# Patient Record
Sex: MALE | Race: WHITE | NOT HISPANIC OR LATINO | Employment: OTHER | URBAN - METROPOLITAN AREA
[De-identification: names, ages, dates, MRNs, and addresses within clinical notes are randomized per-mention and may not be internally consistent; named-entity substitution may affect disease eponyms.]

---

## 2019-04-08 ENCOUNTER — OFFICE VISIT (OUTPATIENT)
Dept: ORTHOPEDICS | Facility: CLINIC | Age: 64
End: 2019-04-08

## 2019-04-08 VITALS
WEIGHT: 180 LBS | SYSTOLIC BLOOD PRESSURE: 124 MMHG | HEART RATE: 79 BPM | DIASTOLIC BLOOD PRESSURE: 70 MMHG | BODY MASS INDEX: 35.34 KG/M2 | HEIGHT: 60 IN

## 2019-04-08 DIAGNOSIS — M48.07 FORAMINAL STENOSIS OF LUMBOSACRAL REGION: ICD-10-CM

## 2019-04-08 DIAGNOSIS — M54.16 LUMBAR RADICULITIS: Primary | ICD-10-CM

## 2019-04-08 DIAGNOSIS — M51.36 DISC DEGENERATION, LUMBAR: ICD-10-CM

## 2019-04-08 PROCEDURE — 72100 X-RAY EXAM L-S SPINE 2/3 VWS: CPT | Mod: ,,, | Performed by: ORTHOPAEDIC SURGERY

## 2019-04-08 PROCEDURE — 72100 PR  X-RAY LUMBAR SPINE 2/3 VW: ICD-10-PCS | Mod: ,,, | Performed by: ORTHOPAEDIC SURGERY

## 2019-04-08 PROCEDURE — 99204 OFFICE O/P NEW MOD 45 MIN: CPT | Mod: ,,, | Performed by: ORTHOPAEDIC SURGERY

## 2019-04-08 PROCEDURE — 99204 PR OFFICE/OUTPT VISIT, NEW, LEVL IV, 45-59 MIN: ICD-10-PCS | Mod: ,,, | Performed by: ORTHOPAEDIC SURGERY

## 2019-04-08 PROCEDURE — 72170 X-RAY EXAM OF PELVIS: CPT | Mod: ,,, | Performed by: ORTHOPAEDIC SURGERY

## 2019-04-08 PROCEDURE — 72170 PR  X-RAY PELVIS 1/2 VW: ICD-10-PCS | Mod: ,,, | Performed by: ORTHOPAEDIC SURGERY

## 2019-04-08 RX ORDER — BUPROPION HYDROCHLORIDE 75 MG/1
75 TABLET ORAL 2 TIMES DAILY
COMMUNITY

## 2019-04-08 RX ORDER — CITALOPRAM 40 MG/1
40 TABLET, FILM COATED ORAL EVERY MORNING
COMMUNITY

## 2019-04-08 RX ORDER — PRAVASTATIN SODIUM 40 MG/1
40 TABLET ORAL DAILY
COMMUNITY
End: 2023-09-25

## 2019-04-08 NOTE — PROGRESS NOTES
"Subjective:       Patient ID: Arsh Damon is a 64 y.o. male.    Chief Complaint: Pain of the Lumbar Spine (Lumbar pain since . Pain radiates down right leg to knee with numbness. Limited walking and standing. NO bowel or bladder problems. )      History of Present Illness  About 18 months ago this gentleman had a syncopal episode and fell and started having complaints of pain in his back going down both legs left leg to the foot the right leg to the knee. He has weakness in his left leg and has a "flopping foot" for the last 16 months he had MRI performed in December 2017 he's had numerous chiropractic treatments he has no bowel or bladder incontinence    Current Medications  Current Outpatient Medications   Medication Sig Dispense Refill    buPROPion (WELLBUTRIN) 75 MG tablet Take 75 mg by mouth 2 (two) times daily.      citalopram (CELEXA) 40 MG tablet Take 40 mg by mouth once daily.      pravastatin (PRAVACHOL) 40 MG tablet Take 40 mg by mouth once daily.       No current facility-administered medications for this visit.        Allergies  Review of patient's allergies indicates:  No Known Allergies    Past Medical History  Past Medical History:   Diagnosis Date    Anxiety     Depression     Hyperlipemia        Surgical History  History reviewed. No pertinent surgical history.    Family History:   History reviewed. No pertinent family history.    Social History:   Social History     Socioeconomic History    Marital status:      Spouse name: Not on file    Number of children: Not on file    Years of education: Not on file    Highest education level: Not on file   Occupational History    Not on file   Social Needs    Financial resource strain: Not on file    Food insecurity:     Worry: Not on file     Inability: Not on file    Transportation needs:     Medical: Not on file     Non-medical: Not on file   Tobacco Use    Smoking status: Former Smoker    Smokeless tobacco: Never Used "   Substance and Sexual Activity    Alcohol use: Not on file    Drug use: Not on file    Sexual activity: Not on file   Lifestyle    Physical activity:     Days per week: Not on file     Minutes per session: Not on file    Stress: Not on file   Relationships    Social connections:     Talks on phone: Not on file     Gets together: Not on file     Attends Rastafarian service: Not on file     Active member of club or organization: Not on file     Attends meetings of clubs or organizations: Not on file     Relationship status: Not on file   Other Topics Concern    Not on file   Social History Narrative    Not on file       Hospitalization/Major Diagnostic Procedure:     Review of Systems     General/Constitutional:  Chills denies. Fatigue denies. Fever denies. Weight gain denies. Weight loss denies.    Respiratory:  Shortness of breath denies.    Cardiovascular:  Chest pain denies.    Gastrointestinal:  Constipation denies. Diarrhea denies. Nausea denies. Vomiting denies.     Hematology:  Easy bruising denies. Prolonged bleeding denies.     Genitourinary:  Frequent urination denies. Pain in lower back denies. Painful urination denies.     Musculoskeletal:  See HPI for details    Skin:  Rash denies.    Neurologic:  Dizziness denies. Gait abnormalities denies. Seizures denies. Tingling/Numbess denies.    Psychiatric:  Anxiety denies. Depressed mood denies.     Objective:   Vital Signs:   Vitals:    04/08/19 1024   BP: 124/70   Pulse: 79        Physical Exam      General Examination:     Constitutional: The patient is alert and oriented to lace person and time. Mood is pleasant.     Head/Face: Normal facial features normal eyebrows    Eyes: Normal extraocular motion bilaterally    Lungs: Respirations are equal and unlabored    Gait is coordinated.    Cardiovascular: There are no swelling or varicosities present.    Lymphatic: Negative for adenopathy    Skin: Normal    Neurological: Level of consciousness normal.  Oriented to place person and time and situation    Psychiatric: Oriented to time place person and situation    Physical exam shows patient can stand erect has moderate restriction of motion this tendency lumbosacral junction range of motion is slightly limited. Straight leg raising is positive on the left. There is no edema adenopathy or varicosities. Motor examination shows grade 45 weakness of left ankle dorsiflexors. Hip and knee range of motion is intact. Peripheral pulses are normal.    XRAY Report/ Interpretation : MRI performed 12/19/2017 was provided on compact disc by the patient and personally reviewed. MRI shows diminished signal intensity at L4-5 disc with a midline annular fissure and bilateral foraminal stenosis caused by diffuse disc bulge and bilateral facet hypertrophy. At L4-5 this diffuse disc bulge and slight facet joint hypertrophy and left-sided foraminal stenosis.  AP and lateral x-rays of lumbar spine will performed today. Indications low back pain. Findings: Mild disc space narrowing L5-S1 level with facet joint sclerosis  AP pelvis x-ray was taken today. Indications low back pain and/or hip pain. Findings AP pelvis x-ray appears to be normal with no evidence of fractures or significant degenerative disease  MRI report was reviewed showing evidence of disc degeneration at L5-S1 a right-sided spondylolysis at L5 and foraminal stenosis bilaterally at L5-S1 interestingly radiologist did not think there is evidence of foraminal stenosis on the left side at L4-5 that I think is present        Assessment:       1. Lumbar radiculitis    2. Foraminal stenosis of lumbosacral region    3. Disc degeneration, lumbar        Plan:       Arsh was seen today for pain.    Diagnoses and all orders for this visit:    Lumbar radiculitis  -     X-Ray Lumbar Spine Ap And Lateral  -     X-Ray Pelvis Routine AP    Foraminal stenosis of lumbosacral region    Disc degeneration, lumbar         No follow-ups on  file.    Treatment options were discussed regards to the nature of the spinal condition conservative pain interventional and surgical options were discussed in detail and the probability of success of the separate treatment options was discussed in detail the patient expressed a clear understanding of the treatment options would regards to surgery the procedure risks benefits complications and outcomes were discussed.  No guarantees were given regards to surgical outcome.  New MRI lumbar spine is advised.  In view of long-standing motor weakness left lower extremity I've advised the patient that he may have to [place] consider having surgical intervention.          This note was created using Dragon voice recognition software that occasionally misinterpreted phrases or words.

## 2019-05-29 ENCOUNTER — OFFICE VISIT (OUTPATIENT)
Dept: ORTHOPEDICS | Facility: CLINIC | Age: 64
End: 2019-05-29

## 2019-05-29 VITALS
HEART RATE: 84 BPM | BODY MASS INDEX: 35.34 KG/M2 | DIASTOLIC BLOOD PRESSURE: 78 MMHG | SYSTOLIC BLOOD PRESSURE: 128 MMHG | HEIGHT: 60 IN | WEIGHT: 180 LBS

## 2019-05-29 DIAGNOSIS — M48.07 FORAMINAL STENOSIS OF LUMBOSACRAL REGION: Primary | ICD-10-CM

## 2019-05-29 DIAGNOSIS — M43.00 PARS DEFECT WITHOUT SPONDYLOLISTHESIS: ICD-10-CM

## 2019-05-29 DIAGNOSIS — M51.37 DISC DEGENERATION, LUMBOSACRAL: ICD-10-CM

## 2019-05-29 PROCEDURE — 99213 OFFICE O/P EST LOW 20 MIN: CPT | Mod: ,,, | Performed by: ORTHOPAEDIC SURGERY

## 2019-05-29 PROCEDURE — 99213 PR OFFICE/OUTPT VISIT, EST, LEVL III, 20-29 MIN: ICD-10-PCS | Mod: ,,, | Performed by: ORTHOPAEDIC SURGERY

## 2019-05-29 RX ORDER — TRAMADOL HYDROCHLORIDE 50 MG/1
50 TABLET ORAL EVERY 6 HOURS PRN
Qty: 30 TABLET | Refills: 4 | Status: SHIPPED | OUTPATIENT
Start: 2019-05-29 | End: 2019-06-05

## 2019-05-29 RX ORDER — TESTOSTERONE CYPIONATE 200 MG/ML
200 INJECTION, SOLUTION INTRAMUSCULAR
Refills: 5 | COMMUNITY
Start: 2019-05-03

## 2019-05-29 NOTE — PROGRESS NOTES
"Subjective:       Patient ID: Arsh Damon is a 64 y.o. male.    Chief Complaint: Pain of the Lower Back (Lumbar MRI results, pain is increased when he stands or when activity level is increased)      History of Present Illness     About 18 months ago this gentleman had a syncopal episode and fell and started having complaints of pain in his back going down both legs left leg to the foot the right leg to the knee. He has weakness in his left leg and has a "flopping foot" for the last 16 months he had MRI performed in December 2017 he's had numerous chiropractic treatments he has no bowel or bladder incontinence and teased severe back and bilateral pain numbness tingling and dysesthesias no bowel or bladder incontinence symptoms have been going on for 17 months.    Current Medications  Current Outpatient Medications   Medication Sig Dispense Refill    buPROPion (WELLBUTRIN) 75 MG tablet Take 75 mg by mouth 2 (two) times daily.      citalopram (CELEXA) 40 MG tablet Take 40 mg by mouth once daily.      pravastatin (PRAVACHOL) 40 MG tablet Take 40 mg by mouth once daily.      testosterone cypionate (DEPOTESTOTERONE CYPIONATE) 200 mg/mL injection TK 1 ML IM WEEKLY  5    traMADol (ULTRAM) 50 mg tablet Take 1 tablet (50 mg total) by mouth every 6 (six) hours as needed for Pain. 30 tablet 4     No current facility-administered medications for this visit.        Allergies  Review of patient's allergies indicates:  No Known Allergies    Past Medical History  Past Medical History:   Diagnosis Date    Anxiety     Depression     Hyperlipemia        Surgical History  History reviewed. No pertinent surgical history.    Family History:   History reviewed. No pertinent family history.    Social History:   Social History     Socioeconomic History    Marital status:      Spouse name: Not on file    Number of children: Not on file    Years of education: Not on file    Highest education level: Not on file "   Occupational History    Not on file   Social Needs    Financial resource strain: Not on file    Food insecurity:     Worry: Not on file     Inability: Not on file    Transportation needs:     Medical: Not on file     Non-medical: Not on file   Tobacco Use    Smoking status: Former Smoker    Smokeless tobacco: Never Used   Substance and Sexual Activity    Alcohol use: Not on file    Drug use: Not on file    Sexual activity: Not on file   Lifestyle    Physical activity:     Days per week: Not on file     Minutes per session: Not on file    Stress: Not on file   Relationships    Social connections:     Talks on phone: Not on file     Gets together: Not on file     Attends Quaker service: Not on file     Active member of club or organization: Not on file     Attends meetings of clubs or organizations: Not on file     Relationship status: Not on file   Other Topics Concern    Not on file   Social History Narrative    Not on file       Hospitalization/Major Diagnostic Procedure:     Review of Systems     General/Constitutional:  Chills denies. Fatigue denies. Fever denies. Weight gain denies. Weight loss denies.    Respiratory:  Shortness of breath denies.    Cardiovascular:  Chest pain denies.    Gastrointestinal:  Constipation denies. Diarrhea denies. Nausea denies. Vomiting denies.     Hematology:  Easy bruising denies. Prolonged bleeding denies.     Genitourinary:  Frequent urination denies. Pain in lower back denies. Painful urination denies.     Musculoskeletal:  See HPI for details    Skin:  Rash denies.    Neurologic:  Dizziness denies. Gait abnormalities denies. Seizures denies. Tingling/Numbess denies.    Psychiatric:  Anxiety denies. Depressed mood denies.     Objective:   Vital Signs:   Vitals:    05/29/19 1127   BP: 128/78   Pulse: 84        Physical Exam      General Examination:     Constitutional: The patient is alert and oriented to lace person and time. Mood is pleasant.     Head/Face:  Normal facial features normal eyebrows    Eyes: Normal extraocular motion bilaterally    Lungs: Respirations are equal and unlabored    Gait is coordinated.    Cardiovascular: There are no swelling or varicosities present.    Lymphatic: Negative for adenopathy    Skin: Normal    Neurological: Level of consciousness normal. Oriented to place person and time and situation    Psychiatric: Oriented to time place person and situation    Bilateral paraspinous muscle spasm L4-S1 range of motion markedly restricted straight leg raising positive on the left. Subjective numbness L5 nerve root distribution bilaterally.  XRAY Report/ Interpretation:     XRAY Report/ Interpretation : MRI performed 12/19/2017 was provided on compact disc by the patient and personally reviewed. MRI shows diminished signal intensity at L4-5 disc with a midline annular fissure and bilateral foraminal stenosis caused by diffuse disc bulge and bilateral facet hypertrophy. At L4-5 this diffuse disc bulge and slight facet joint hypertrophy and left-sided foraminal stenosis.  AP and lateral x-rays of lumbar spine will performed today. Indications low back pain. Findings: Mild disc space narrowing L5-S1 level with facet joint sclerosis  AP pelvis x-ray was taken today. Indications low back pain and/or hip pain. Findings AP pelvis x-ray appears to be normal with no evidence of fractures or significant degenerative disease  MRI report was reviewed showing evidence of disc degeneration at L5-S1 a right-sided spondylolysis at L5 and foraminal stenosis bilaterally at L5-S1 interestingly radiologist did not think there is evidence of foraminal stenosis on the left side at L4-5 that I think is present  Lumbar mri 4/16/19 reviewed degenerative changes noted with suspicion of bilateral pars defects at L4-5 without spondylolisthesis a left-sided pars and articular is defect may have developed in the interval there is significant neural foraminal narrowing  bilaterally seen at this level traversing and affecting the L5 nerve roots at the level of the neural foramen    Assessment:       1. Foraminal stenosis of lumbosacral region    2. Pars defect without spondylolisthesis    3. Disc degeneration, lumbosacral        Plan:       Arsh was seen today for pain.    Diagnoses and all orders for this visit:    Foraminal stenosis of lumbosacral region    Pars defect without spondylolisthesis  Comments:  L5-S1    Disc degeneration, lumbosacral    Other orders  -     traMADol (ULTRAM) 50 mg tablet; Take 1 tablet (50 mg total) by mouth every 6 (six) hours as needed for Pain.         Follow up if symptoms worsen or fail to improve.    Treatment options were discussed regards to the nature of the spinal condition conservative pain interventional and surgical options were discussed in detail and the probability of success of the separate treatment options was discussed in detail the patient expressed a clear understanding of the treatment options would regards to surgery the procedure risks benefits complications and outcomes were discussed.  No guarantees were given regards to surgical outcome.  Progress  The risk associated with the spinal condition and an worsening of the condition was discussed with the patient expressed a thorough understanding.  The patient was warned about the possible development of cauda equina syndrome and its symptoms which include but are not limited to bowel or bladder dysfunction sexual dysfunction increasing pain increasing extremity numbness or weakness and saddle anesthesia.  The patient was advised to either contact me immediately or to go to the nearest hospital emergency room if symptoms of cauda equina syndrome with to develop.  The patient expressed an understanding of these instructions.  He plans on apply so security so that he might undergo surgery surgery was only option as well as resolving his complaints. I do believe that he is disabled.  His symptoms are not tracked ago where his condition improved in the foreseeable future and certainly not within the next 12 months    This note was created using Dragon voice recognition software that occasionally misinterpreted phrases or words.

## 2019-10-28 ENCOUNTER — TELEPHONE (OUTPATIENT)
Dept: ORTHOPEDICS | Facility: CLINIC | Age: 64
End: 2019-10-28

## 2019-10-28 NOTE — TELEPHONE ENCOUNTER
----- Message from Sheryl Penn sent at 10/23/2019 10:55 AM CDT -----  Contact: patient  Dr huerta pt-called to find out if we heard back from his ssi to cover the surgery pts# [phone]

## 2019-10-28 NOTE — TELEPHONE ENCOUNTER
----- Message from Leslye Jurado sent at 10/25/2019  9:57 AM CDT -----  Contact: patient  Patient is calling about disability paperwork that needed to be done, call patient [phone].

## 2019-11-22 ENCOUNTER — HISTORICAL (OUTPATIENT)
Dept: ADMINISTRATIVE | Facility: HOSPITAL | Age: 64
End: 2019-11-22

## 2019-11-22 LAB
ALBUMIN SERPL-MCNC: 3.7 GM/DL (ref 3.4–5)
ALBUMIN/GLOB SERPL: 1 RATIO (ref 1.1–2)
ALP SERPL-CCNC: 58 UNIT/L (ref 45–117)
ALT SERPL-CCNC: 36 UNIT/L (ref 12–78)
AST SERPL-CCNC: 14 UNIT/L (ref 15–37)
BILIRUB SERPL-MCNC: 0.5 MG/DL (ref 0.2–1)
BILIRUBIN DIRECT+TOT PNL SERPL-MCNC: 0.1 MG/DL (ref 0–0.2)
BILIRUBIN DIRECT+TOT PNL SERPL-MCNC: 0.4 MG/DL
BUN SERPL-MCNC: 19 MG/DL (ref 7–18)
CALCIUM SERPL-MCNC: 8.8 MG/DL (ref 8.5–10.1)
CHLORIDE SERPL-SCNC: 103 MMOL/L (ref 98–107)
CHOLEST SERPL-MCNC: 233 MG/DL
CHOLEST/HDLC SERPL: 6.7 {RATIO} (ref 0–5)
CO2 SERPL-SCNC: 32 MMOL/L (ref 21–32)
CREAT SERPL-MCNC: 0.9 MG/DL (ref 0.6–1.3)
GLOBULIN SER-MCNC: 3.8 GM/ML (ref 2.3–3.5)
GLUCOSE SERPL-MCNC: 92 MG/DL (ref 74–106)
HDLC SERPL-MCNC: 35 MG/DL (ref 40–59)
LDLC SERPL CALC-MCNC: 167 MG/DL
POTASSIUM SERPL-SCNC: 4.4 MMOL/L (ref 3.5–5.1)
PROT SERPL-MCNC: 7.5 GM/DL (ref 6.4–8.2)
PSA SERPL-MCNC: 1.4 NG/ML
SODIUM SERPL-SCNC: 139 MMOL/L (ref 136–145)
T4 FREE SERPL-MCNC: 0.85 NG/DL (ref 0.76–1.46)
TESTOST SERPL-MCNC: 1659 NG/DL (ref 241–827)
TRIGL SERPL-MCNC: 154 MG/DL
TSH SERPL-ACNC: 1.42 MIU/L (ref 0.36–3.74)
VIT B12 SERPL-MCNC: 561 PG/ML (ref 193–986)
VLDLC SERPL CALC-MCNC: 31 MG/DL

## 2021-06-08 ENCOUNTER — HISTORICAL (OUTPATIENT)
Dept: ADMINISTRATIVE | Facility: HOSPITAL | Age: 66
End: 2021-06-08

## 2022-04-11 ENCOUNTER — HISTORICAL (OUTPATIENT)
Dept: ADMINISTRATIVE | Facility: HOSPITAL | Age: 67
End: 2022-04-11

## 2022-04-24 VITALS
WEIGHT: 181.69 LBS | SYSTOLIC BLOOD PRESSURE: 120 MMHG | HEIGHT: 60 IN | BODY MASS INDEX: 35.67 KG/M2 | DIASTOLIC BLOOD PRESSURE: 69 MMHG | OXYGEN SATURATION: 94 %

## 2022-07-08 ENCOUNTER — HOSPITAL ENCOUNTER (INPATIENT)
Facility: HOSPITAL | Age: 67
LOS: 2 days | Discharge: LEFT BEFORE DISCHARGE | DRG: 177 | End: 2022-07-11
Attending: EMERGENCY MEDICINE | Admitting: INTERNAL MEDICINE
Payer: MEDICARE

## 2022-07-08 DIAGNOSIS — R06.02 SHORTNESS OF BREATH: ICD-10-CM

## 2022-07-08 DIAGNOSIS — J96.01 ACUTE HYPOXEMIC RESPIRATORY FAILURE: ICD-10-CM

## 2022-07-08 DIAGNOSIS — R06.02 SOB (SHORTNESS OF BREATH): ICD-10-CM

## 2022-07-08 DIAGNOSIS — I21.4 NSTEMI (NON-ST ELEVATED MYOCARDIAL INFARCTION): ICD-10-CM

## 2022-07-08 DIAGNOSIS — J12.82 PNEUMONIA DUE TO COVID-19 VIRUS: ICD-10-CM

## 2022-07-08 DIAGNOSIS — R41.0 DELIRIUM: Primary | ICD-10-CM

## 2022-07-08 DIAGNOSIS — U07.1 PNEUMONIA DUE TO COVID-19 VIRUS: ICD-10-CM

## 2022-07-08 PROCEDURE — 84484 ASSAY OF TROPONIN QUANT: CPT | Performed by: EMERGENCY MEDICINE

## 2022-07-08 PROCEDURE — 83880 ASSAY OF NATRIURETIC PEPTIDE: CPT | Performed by: EMERGENCY MEDICINE

## 2022-07-08 PROCEDURE — 25000242 PHARM REV CODE 250 ALT 637 W/ HCPCS: Performed by: EMERGENCY MEDICINE

## 2022-07-08 PROCEDURE — 87636 SARSCOV2 & INF A&B AMP PRB: CPT | Performed by: EMERGENCY MEDICINE

## 2022-07-08 PROCEDURE — 96365 THER/PROPH/DIAG IV INF INIT: CPT

## 2022-07-08 PROCEDURE — 93010 ELECTROCARDIOGRAM REPORT: CPT | Mod: ,,, | Performed by: INTERNAL MEDICINE

## 2022-07-08 PROCEDURE — 94640 AIRWAY INHALATION TREATMENT: CPT

## 2022-07-08 PROCEDURE — 27000221 HC OXYGEN, UP TO 24 HOURS

## 2022-07-08 PROCEDURE — 80053 COMPREHEN METABOLIC PANEL: CPT | Performed by: EMERGENCY MEDICINE

## 2022-07-08 PROCEDURE — 85379 FIBRIN DEGRADATION QUANT: CPT | Performed by: EMERGENCY MEDICINE

## 2022-07-08 PROCEDURE — 94760 N-INVAS EAR/PLS OXIMETRY 1: CPT

## 2022-07-08 PROCEDURE — 36415 COLL VENOUS BLD VENIPUNCTURE: CPT | Performed by: EMERGENCY MEDICINE

## 2022-07-08 PROCEDURE — 87040 BLOOD CULTURE FOR BACTERIA: CPT | Performed by: EMERGENCY MEDICINE

## 2022-07-08 PROCEDURE — 83605 ASSAY OF LACTIC ACID: CPT | Performed by: EMERGENCY MEDICINE

## 2022-07-08 PROCEDURE — 96375 TX/PRO/DX INJ NEW DRUG ADDON: CPT

## 2022-07-08 PROCEDURE — 93010 EKG 12-LEAD: ICD-10-PCS | Mod: ,,, | Performed by: INTERNAL MEDICINE

## 2022-07-08 PROCEDURE — 85025 COMPLETE CBC W/AUTO DIFF WBC: CPT | Performed by: EMERGENCY MEDICINE

## 2022-07-08 PROCEDURE — 93005 ELECTROCARDIOGRAM TRACING: CPT

## 2022-07-08 PROCEDURE — 99285 EMERGENCY DEPT VISIT HI MDM: CPT | Mod: 25

## 2022-07-08 RX ORDER — IPRATROPIUM BROMIDE AND ALBUTEROL SULFATE 2.5; .5 MG/3ML; MG/3ML
3 SOLUTION RESPIRATORY (INHALATION)
Status: COMPLETED | OUTPATIENT
Start: 2022-07-08 | End: 2022-07-08

## 2022-07-08 RX ORDER — KETOROLAC TROMETHAMINE 30 MG/ML
30 INJECTION, SOLUTION INTRAMUSCULAR; INTRAVENOUS ONCE
Status: COMPLETED | OUTPATIENT
Start: 2022-07-09 | End: 2022-07-09

## 2022-07-08 RX ADMIN — IPRATROPIUM BROMIDE AND ALBUTEROL SULFATE 3 ML: 2.5; .5 SOLUTION RESPIRATORY (INHALATION) at 11:07

## 2022-07-09 ENCOUNTER — HOSPITAL ENCOUNTER (OUTPATIENT)
Dept: RADIOLOGY | Facility: HOSPITAL | Age: 67
Discharge: HOME | End: 2022-07-09
Attending: EMERGENCY MEDICINE
Payer: MEDICARE

## 2022-07-09 PROBLEM — J96.01 ACUTE HYPOXEMIC RESPIRATORY FAILURE DUE TO COVID-19: Status: ACTIVE | Noted: 2022-07-09

## 2022-07-09 PROBLEM — F41.9 ANXIETY: Status: ACTIVE | Noted: 2022-07-09

## 2022-07-09 PROBLEM — E78.5 HLD (HYPERLIPIDEMIA): Status: ACTIVE | Noted: 2022-07-09

## 2022-07-09 PROBLEM — U07.1 ACUTE HYPOXEMIC RESPIRATORY FAILURE DUE TO COVID-19: Status: ACTIVE | Noted: 2022-07-09

## 2022-07-09 LAB
ALBUMIN SERPL-MCNC: 3.7 GM/DL (ref 3.4–4.8)
ALBUMIN SERPL-MCNC: 3.9 GM/DL (ref 3.4–4.8)
ALBUMIN/GLOB SERPL: 1 RATIO (ref 1.1–2)
ALBUMIN/GLOB SERPL: 1.4 RATIO (ref 1.1–2)
ALP SERPL-CCNC: 49 UNIT/L (ref 40–150)
ALP SERPL-CCNC: 50 UNIT/L (ref 40–150)
ALT SERPL-CCNC: 32 UNIT/L (ref 0–55)
ALT SERPL-CCNC: 34 UNIT/L (ref 0–55)
APPEARANCE UR: CLEAR
AST SERPL-CCNC: 84 UNIT/L (ref 5–34)
AST SERPL-CCNC: 91 UNIT/L (ref 5–34)
BACTERIA #/AREA URNS AUTO: ABNORMAL /HPF
BASE EXCESS ARTERIAL: 12 MMOL/L (ref -2–2)
BASOPHILS # BLD AUTO: 0.01 X10(3)/MCL (ref 0–0.2)
BASOPHILS # BLD AUTO: 0.01 X10(3)/MCL (ref 0–0.2)
BASOPHILS NFR BLD AUTO: 0.1 %
BASOPHILS NFR BLD AUTO: 0.1 %
BILIRUB UR QL STRIP.AUTO: NEGATIVE MG/DL
BILIRUBIN DIRECT+TOT PNL SERPL-MCNC: 0.7 MG/DL
BILIRUBIN DIRECT+TOT PNL SERPL-MCNC: 0.7 MG/DL
BNP BLD-MCNC: 139.8 PG/ML
BUN SERPL-MCNC: 27.1 MG/DL (ref 8.4–25.7)
BUN SERPL-MCNC: 30.2 MG/DL (ref 8.4–25.7)
CALCIUM SERPL-MCNC: 8.9 MG/DL (ref 8.8–10)
CALCIUM SERPL-MCNC: 9.1 MG/DL (ref 8.8–10)
CHLORIDE SERPL-SCNC: 93 MMOL/L (ref 98–107)
CHLORIDE SERPL-SCNC: 95 MMOL/L (ref 98–107)
CK MB SERPL-MCNC: 9.4 NG/ML
CK SERPL-CCNC: 2555 U/L (ref 30–200)
CO2 SERPL-SCNC: 32 MMOL/L (ref 23–31)
CO2 SERPL-SCNC: 34 MMOL/L (ref 23–31)
COLOR UR AUTO: YELLOW
CREAT SERPL-MCNC: 0.95 MG/DL (ref 0.73–1.18)
CREAT SERPL-MCNC: 0.97 MG/DL (ref 0.73–1.18)
D DIMER PPP IA.FEU-MCNC: 1.24 UG/ML FEU (ref 0–0.5)
EOSINOPHIL # BLD AUTO: 0.01 X10(3)/MCL (ref 0–0.9)
EOSINOPHIL # BLD AUTO: 0.02 X10(3)/MCL (ref 0–0.9)
EOSINOPHIL NFR BLD AUTO: 0.1 %
EOSINOPHIL NFR BLD AUTO: 0.2 %
ERYTHROCYTE [DISTWIDTH] IN BLOOD BY AUTOMATED COUNT: 12.9 % (ref 11.5–17)
ERYTHROCYTE [DISTWIDTH] IN BLOOD BY AUTOMATED COUNT: 13 % (ref 11.5–17)
FLUAV AG UPPER RESP QL IA.RAPID: NOT DETECTED
FLUBV AG UPPER RESP QL IA.RAPID: NOT DETECTED
GLOBULIN SER-MCNC: 2.8 GM/DL (ref 2.4–3.5)
GLOBULIN SER-MCNC: 3.6 GM/DL (ref 2.4–3.5)
GLUCOSE SERPL-MCNC: 105 MG/DL (ref 82–115)
GLUCOSE SERPL-MCNC: 129 MG/DL (ref 82–115)
GLUCOSE UR QL STRIP.AUTO: NEGATIVE MG/DL
HCO3 ARTERIAL: 36.4 MMOL/L (ref 18–23)
HCT VFR BLD AUTO: 43.1 % (ref 42–52)
HCT VFR BLD AUTO: 45.8 % (ref 42–52)
HGB BLD-MCNC: 14.2 GM/DL (ref 14–18)
HGB BLD-MCNC: 15.1 GM/DL (ref 14–18)
HGB BLD-MCNC: ABNORMAL G/DL
IMM GRANULOCYTES # BLD AUTO: 0.04 X10(3)/MCL (ref 0–0.04)
IMM GRANULOCYTES # BLD AUTO: 0.05 X10(3)/MCL (ref 0–0.04)
IMM GRANULOCYTES NFR BLD AUTO: 0.5 %
IMM GRANULOCYTES NFR BLD AUTO: 0.6 %
KETONES UR QL STRIP.AUTO: NEGATIVE MG/DL
LACTATE SERPL-SCNC: 0.8 MMOL/L (ref 0.5–2.2)
LEUKOCYTE ESTERASE UR QL STRIP.AUTO: NEGATIVE UNIT/L
LYMPHOCYTES # BLD AUTO: 0.52 X10(3)/MCL (ref 0.6–4.6)
LYMPHOCYTES # BLD AUTO: 0.7 X10(3)/MCL (ref 0.6–4.6)
LYMPHOCYTES NFR BLD AUTO: 6.1 %
LYMPHOCYTES NFR BLD AUTO: 8.1 %
MCH RBC QN AUTO: 30.8 PG (ref 27–31)
MCH RBC QN AUTO: 31.1 PG (ref 27–31)
MCHC RBC AUTO-ENTMCNC: 32.9 MG/DL (ref 33–36)
MCHC RBC AUTO-ENTMCNC: 33 MG/DL (ref 33–36)
MCV RBC AUTO: 93.3 FL (ref 80–94)
MCV RBC AUTO: 94.3 FL (ref 80–94)
MONOCYTES # BLD AUTO: 0.53 X10(3)/MCL (ref 0.1–1.3)
MONOCYTES # BLD AUTO: 0.77 X10(3)/MCL (ref 0.1–1.3)
MONOCYTES NFR BLD AUTO: 6.2 %
MONOCYTES NFR BLD AUTO: 8.9 %
NEUTROPHILS # BLD AUTO: 7.1 X10(3)/MCL (ref 2.1–9.2)
NEUTROPHILS # BLD AUTO: 7.5 X10(3)/MCL (ref 2.1–9.2)
NEUTROPHILS NFR BLD AUTO: 82.1 %
NEUTROPHILS NFR BLD AUTO: 87 %
NITRITE UR QL STRIP.AUTO: NEGATIVE
NRBC BLD AUTO-RTO: 0 %
NRBC BLD AUTO-RTO: 0 %
PCO2 BLDA: 57.4 MM[HG]
PCO2 BLDA: ABNORMAL MM[HG]
PH SMN: 7.41 [PH]
PH UR STRIP.AUTO: 6 [PH]
PLATELET # BLD AUTO: 180 X10(3)/MCL (ref 130–400)
PLATELET # BLD AUTO: 181 X10(3)/MCL (ref 130–400)
PLATELETS.RETICULATED NFR BLD AUTO: 2.2 % (ref 0.9–11.2)
PMV BLD AUTO: 8.7 FL (ref 7.4–10.4)
PMV BLD AUTO: 9.1 FL (ref 7.4–10.4)
PO2 BLDA: 158 MM[HG]
POC COHB: ABNORMAL
POC IONIZED CALCIUM: ABNORMAL
POC METHB: ABNORMAL
POC O2HB: ABNORMAL
POTASSIUM BLD-SCNC: ABNORMAL MMOL/L
POTASSIUM SERPL-SCNC: 4.9 MMOL/L (ref 3.5–5.1)
POTASSIUM SERPL-SCNC: 5.2 MMOL/L (ref 3.5–5.1)
PROT SERPL-MCNC: 6.7 GM/DL (ref 5.8–7.6)
PROT SERPL-MCNC: 7.3 GM/DL (ref 5.8–7.6)
PROT UR QL STRIP.AUTO: ABNORMAL MG/DL
RBC # BLD AUTO: 4.57 X10(6)/MCL (ref 4.7–6.1)
RBC # BLD AUTO: 4.91 X10(6)/MCL (ref 4.7–6.1)
RBC #/AREA URNS AUTO: ABNORMAL /HPF
RBC UR QL AUTO: ABNORMAL UNIT/L
SARS-COV-2 RNA RESP QL NAA+PROBE: DETECTED
SATURATED O2 ARTERIAL, I-STAT: 99
SODIUM BLD-SCNC: ABNORMAL MMOL/L
SODIUM SERPL-SCNC: 137 MMOL/L (ref 136–145)
SODIUM SERPL-SCNC: 138 MMOL/L (ref 136–145)
SP GR UR STRIP.AUTO: 1.02
SQUAMOUS #/AREA URNS AUTO: ABNORMAL /HPF
TROPONIN I SERPL-MCNC: 1.73 NG/ML (ref 0–0.04)
TROPONIN I SERPL-MCNC: 1.73 NG/ML (ref 0–0.04)
UROBILINOGEN UR STRIP-ACNC: 0.2 MG/DL
WBC # SPEC AUTO: 8.6 X10(3)/MCL (ref 4.5–11.5)
WBC # SPEC AUTO: 8.7 X10(3)/MCL (ref 4.5–11.5)
WBC #/AREA URNS AUTO: ABNORMAL /HPF

## 2022-07-09 PROCEDURE — 25000003 PHARM REV CODE 250: Performed by: EMERGENCY MEDICINE

## 2022-07-09 PROCEDURE — 11000001 HC ACUTE MED/SURG PRIVATE ROOM

## 2022-07-09 PROCEDURE — 81001 URINALYSIS AUTO W/SCOPE: CPT | Performed by: EMERGENCY MEDICINE

## 2022-07-09 PROCEDURE — 25000003 PHARM REV CODE 250: Performed by: INTERNAL MEDICINE

## 2022-07-09 PROCEDURE — 80053 COMPREHEN METABOLIC PANEL: CPT | Performed by: EMERGENCY MEDICINE

## 2022-07-09 PROCEDURE — 82550 ASSAY OF CK (CPK): CPT | Performed by: EMERGENCY MEDICINE

## 2022-07-09 PROCEDURE — 27000190 HC CPAP FULL FACE MASK W/VALVE

## 2022-07-09 PROCEDURE — 84484 ASSAY OF TROPONIN QUANT: CPT | Performed by: EMERGENCY MEDICINE

## 2022-07-09 PROCEDURE — 94660 CPAP INITIATION&MGMT: CPT

## 2022-07-09 PROCEDURE — 25500020 PHARM REV CODE 255: Performed by: EMERGENCY MEDICINE

## 2022-07-09 PROCEDURE — 82553 CREATINE MB FRACTION: CPT | Performed by: EMERGENCY MEDICINE

## 2022-07-09 PROCEDURE — 85025 COMPLETE CBC W/AUTO DIFF WBC: CPT | Performed by: EMERGENCY MEDICINE

## 2022-07-09 PROCEDURE — 36415 COLL VENOUS BLD VENIPUNCTURE: CPT | Performed by: EMERGENCY MEDICINE

## 2022-07-09 PROCEDURE — 27000207 HC ISOLATION

## 2022-07-09 PROCEDURE — 63600175 PHARM REV CODE 636 W HCPCS: Performed by: EMERGENCY MEDICINE

## 2022-07-09 PROCEDURE — 99900035 HC TECH TIME PER 15 MIN (STAT)

## 2022-07-09 PROCEDURE — 63600175 PHARM REV CODE 636 W HCPCS: Performed by: INTERNAL MEDICINE

## 2022-07-09 PROCEDURE — 71046 X-RAY EXAM CHEST 2 VIEWS: CPT | Mod: TC

## 2022-07-09 PROCEDURE — 27000221 HC OXYGEN, UP TO 24 HOURS

## 2022-07-09 PROCEDURE — 82803 BLOOD GASES ANY COMBINATION: CPT

## 2022-07-09 RX ORDER — CHLORPROMAZINE HYDROCHLORIDE 25 MG/1
50 TABLET, FILM COATED ORAL 2 TIMES DAILY
COMMUNITY
Start: 2022-07-08 | End: 2023-09-25

## 2022-07-09 RX ORDER — SODIUM CHLORIDE 0.9 % (FLUSH) 0.9 %
10 SYRINGE (ML) INJECTION
Status: DISCONTINUED | OUTPATIENT
Start: 2022-07-09 | End: 2022-07-11 | Stop reason: HOSPADM

## 2022-07-09 RX ORDER — ATORVASTATIN CALCIUM 40 MG/1
40 TABLET, FILM COATED ORAL EVERY MORNING
COMMUNITY
Start: 2022-06-14

## 2022-07-09 RX ORDER — BUPROPION HYDROCHLORIDE 75 MG/1
75 TABLET ORAL 2 TIMES DAILY
Status: DISCONTINUED | OUTPATIENT
Start: 2022-07-09 | End: 2022-07-11 | Stop reason: HOSPADM

## 2022-07-09 RX ORDER — HYDROCODONE BITARTRATE AND ACETAMINOPHEN 10; 325 MG/1; MG/1
1 TABLET ORAL EVERY 4 HOURS PRN
Status: DISCONTINUED | OUTPATIENT
Start: 2022-07-09 | End: 2022-07-11 | Stop reason: HOSPADM

## 2022-07-09 RX ORDER — REMDESIVIR 100 MG/1
INJECTION, POWDER, LYOPHILIZED, FOR SOLUTION INTRAVENOUS
Status: DISPENSED
Start: 2022-07-09 | End: 2022-07-09

## 2022-07-09 RX ORDER — ENOXAPARIN SODIUM 100 MG/ML
40 INJECTION SUBCUTANEOUS EVERY 24 HOURS
Status: DISCONTINUED | OUTPATIENT
Start: 2022-07-09 | End: 2022-07-11 | Stop reason: HOSPADM

## 2022-07-09 RX ORDER — DEXAMETHASONE SODIUM PHOSPHATE 4 MG/ML
6 INJECTION, SOLUTION INTRA-ARTICULAR; INTRALESIONAL; INTRAMUSCULAR; INTRAVENOUS; SOFT TISSUE
Status: COMPLETED | OUTPATIENT
Start: 2022-07-09 | End: 2022-07-09

## 2022-07-09 RX ORDER — TIZANIDINE 4 MG/1
4 TABLET ORAL EVERY 8 HOURS PRN
COMMUNITY
Start: 2022-07-07 | End: 2023-09-25

## 2022-07-09 RX ORDER — TALC
6 POWDER (GRAM) TOPICAL NIGHTLY PRN
Status: DISCONTINUED | OUTPATIENT
Start: 2022-07-09 | End: 2022-07-11 | Stop reason: HOSPADM

## 2022-07-09 RX ORDER — HYDROCODONE BITARTRATE AND ACETAMINOPHEN 10; 325 MG/1; MG/1
1 TABLET ORAL EVERY 4 HOURS PRN
COMMUNITY

## 2022-07-09 RX ORDER — BUPROPION HYDROCHLORIDE 150 MG/1
150 TABLET ORAL DAILY
Status: DISCONTINUED | OUTPATIENT
Start: 2022-07-09 | End: 2022-07-09

## 2022-07-09 RX ORDER — DEXAMETHASONE SODIUM PHOSPHATE 4 MG/ML
6 INJECTION, SOLUTION INTRA-ARTICULAR; INTRALESIONAL; INTRAMUSCULAR; INTRAVENOUS; SOFT TISSUE EVERY 24 HOURS
Status: DISCONTINUED | OUTPATIENT
Start: 2022-07-10 | End: 2022-07-11 | Stop reason: HOSPADM

## 2022-07-09 RX ORDER — FUROSEMIDE 10 MG/ML
40 INJECTION INTRAMUSCULAR; INTRAVENOUS
Status: COMPLETED | OUTPATIENT
Start: 2022-07-09 | End: 2022-07-09

## 2022-07-09 RX ORDER — ATORVASTATIN CALCIUM 40 MG/1
40 TABLET, FILM COATED ORAL DAILY
Status: DISCONTINUED | OUTPATIENT
Start: 2022-07-09 | End: 2022-07-11 | Stop reason: HOSPADM

## 2022-07-09 RX ORDER — PANTOPRAZOLE SODIUM 40 MG/1
40 TABLET, DELAYED RELEASE ORAL DAILY
Status: DISCONTINUED | OUTPATIENT
Start: 2022-07-09 | End: 2022-07-11 | Stop reason: HOSPADM

## 2022-07-09 RX ORDER — IPRATROPIUM BROMIDE AND ALBUTEROL SULFATE 2.5; .5 MG/3ML; MG/3ML
3 SOLUTION RESPIRATORY (INHALATION)
Status: ACTIVE | OUTPATIENT
Start: 2022-07-09 | End: 2022-07-09

## 2022-07-09 RX ORDER — BUPROPION HYDROCHLORIDE 150 MG/1
150 TABLET ORAL EVERY MORNING
COMMUNITY
Start: 2022-05-17

## 2022-07-09 RX ADMIN — ENOXAPARIN SODIUM 40 MG: 100 INJECTION SUBCUTANEOUS at 05:07

## 2022-07-09 RX ADMIN — HYDROCODONE BITARTRATE AND ACETAMINOPHEN 1 TABLET: 10; 325 TABLET ORAL at 06:07

## 2022-07-09 RX ADMIN — KETOROLAC TROMETHAMINE 30 MG: 30 INJECTION, SOLUTION INTRAMUSCULAR at 12:07

## 2022-07-09 RX ADMIN — IOPAMIDOL 100 ML: 755 INJECTION, SOLUTION INTRAVENOUS at 02:07

## 2022-07-09 RX ADMIN — BUPROPION HYDROCHLORIDE 75 MG: 75 TABLET, FILM COATED ORAL at 11:07

## 2022-07-09 RX ADMIN — REMDESIVIR 200 MG: 100 INJECTION, POWDER, LYOPHILIZED, FOR SOLUTION INTRAVENOUS at 01:07

## 2022-07-09 RX ADMIN — HYDROCODONE BITARTRATE AND ACETAMINOPHEN 1 TABLET: 10; 325 TABLET ORAL at 10:07

## 2022-07-09 RX ADMIN — ATORVASTATIN CALCIUM 40 MG: 40 TABLET, FILM COATED ORAL at 11:07

## 2022-07-09 RX ADMIN — PIPERACILLIN SODIUM,TAZOBACTAM SODIUM 4.5 G: 4; .5 INJECTION, POWDER, FOR SOLUTION INTRAVENOUS at 12:07

## 2022-07-09 RX ADMIN — PANTOPRAZOLE SODIUM 40 MG: 40 TABLET, DELAYED RELEASE ORAL at 11:07

## 2022-07-09 RX ADMIN — BUPROPION HYDROCHLORIDE 75 MG: 75 TABLET, FILM COATED ORAL at 10:07

## 2022-07-09 RX ADMIN — DEXAMETHASONE SODIUM PHOSPHATE 6 MG: 4 INJECTION, SOLUTION INTRA-ARTICULAR; INTRALESIONAL; INTRAMUSCULAR; INTRAVENOUS; SOFT TISSUE at 01:07

## 2022-07-09 RX ADMIN — FUROSEMIDE 40 MG: 10 INJECTION, SOLUTION INTRAMUSCULAR; INTRAVENOUS at 12:07

## 2022-07-09 NOTE — ED NOTES
Pt void additional 700 cc yellow urine and stood at bedside removing mask. Pt encouraged to keep mask on and made comfortable  e as possible

## 2022-07-09 NOTE — H&P
[facility] EMERGENCY DEPARTMENT    Hospital Medicine History & Physical Examination       Patient Name: Arsh Damon  MRN: [id number]  Patient Class: IP- Inpatient   Admission Date: 7/8/2022   Admitting Physician: MARQUISE Service   Length of Stay: 0  Attending Physician: Saul Damon MD  Primary Care Provider: Primary Doctor No  Face-to-Face encounter date: 07/09/2022    Code Status: Full Code    Chief Complaint: Altered Mental Status (Back surgery on Wednesday,discharged to home Thursday. As per wife talking out of his head after taking pain meds with shortness of breath.)          HISTORY OF PRESENT ILLNESS:   Arsh Damon is a 67 y.o. male who  has a past medical history of Anxiety, Depression, and Hyperlipemia.. The patient presented to [facility] on 7/8/2022 with a primary complaint of confusion.  Pt. Had back surgery 7/6 which went fine and was discharged next day.  After getting home Thursday afternoon he became agitated confused. Back pain was controlled, wife called md instructing them to decrease pain med and muscle relaxer in half.  Confusion persisted, pt begin to have low grade fever persisted into Friday.  He also developed a mild cough with occasional thick brown mucus and sweats. Pt presented to ER for evaluation.  In ER patient remained confused, O2 70s% bipap was placed, occasionally pt pulls off mask.  Oxygen saturation has improved and pt. Mental status is clearing upon my evaluation.  He appears comfortable answering question appropriately. He is unvaccinated and has never had covid infection.  He says he felt a mild sinus drip Monday Tuesday but wednesday felt fine. Workup covid +, cardiac enzymes + denies cp. CT neg. For PE, b/l patchy and consolidative airspace opacities.pt has a bipap at home but says does not wear, does not tolerate.    PAST MEDICAL HISTORY:     Past Medical History:   Diagnosis Date    Anxiety     Depression     Hyperlipemia        PAST  SURGICAL HISTORY:     Past Surgical History:   Procedure Laterality Date    BACK SURGERY         ALLERGIES:   Patient has no known allergies.    FAMILY HISTORY:   family history includes Diabetes in his mother; No Known Problems in his father.    SOCIAL HISTORY:     Social History     Tobacco Use    Smoking status: Never Smoker    Smokeless tobacco: Never Used   Substance Use Topics    Alcohol use: Never        HOME MEDICATIONS:     Prior to Admission medications    Medication Sig Start Date End Date Taking? Authorizing Provider   atorvastatin (LIPITOR) 40 MG tablet Take 40 mg by mouth once daily. 6/14/22   Historical Provider   buPROPion (WELLBUTRIN XL) 150 MG TB24 tablet Take 150 mg by mouth once daily. 5/17/22   Historical Provider   buPROPion (WELLBUTRIN) 75 MG tablet Take 75 mg by mouth 2 (two) times daily.    Historical Provider   chlorproMAZINE (THORAZINE) 25 MG tablet Take 50 mg by mouth 2 (two) times daily. 7/8/22   Historical Provider   citalopram (CELEXA) 40 MG tablet Take 40 mg by mouth once daily.    Historical Provider   HYDROcodone-acetaminophen (NORCO)  mg per tablet Take 1 tablet by mouth.    Historical Provider   pravastatin (PRAVACHOL) 40 MG tablet Take 40 mg by mouth once daily.    Historical Provider   testosterone cypionate (DEPOTESTOTERONE CYPIONATE) 200 mg/mL injection TK 1 ML IM WEEKLY 5/3/19   Historical Provider   tiZANidine (ZANAFLEX) 4 MG tablet Take 4 mg by mouth every 8 (eight) hours as needed. 7/7/22   Historical Provider       REVIEW OF SYSTEMS:   Except as documented, all other systems reviewed and negative   ROS      PHYSICAL EXAM:     VITAL SIGNS: 24 HRS MIN & MAX LAST   Temp  Min: 98.2 °F (36.8 °C)  Max: 103 °F (39.4 °C) 98.2 °F (36.8 °C)   BP  Min: 113/67  Max: 134/81 134/81   Pulse  Min: 90  Max: 116  90   Resp  Min: 17  Max: 22 17   SpO2  Min: 78 %  Max: 97 % 97 %       General appearance: obese male in no apparent distress.  HENT: Atraumatic head. Moist mucous  membranes of oral cavity.  Eyes: Normal extraocular movements.   Neck: Supple.   Lungs: Clear to auscultation bilaterally. No wheezing present. bipap   Heart: Regular rate and rhythm. S1 and S2 present with no murmurs/gallop/rub. No pedal edema. No JVD present.   Abdomen: Soft, non-distended, non-tender. No rebound tenderness/guarding. Bowel sounds are normal.   Extremities: No cyanosis, clubbing, or edema.  Skin: No Rash.   Neuro: Motor and sensory exams grossly intact. Good tone. Muscle strength 5/5 in all 4 extremities  Psych/mental status: Appropriate mood and affect. Responds appropriately to questions.     LABS AND IMAGING:     Recent Labs   Lab 07/08/22 2334   WBC 8.7   RBC 4.57*   HGB 14.2   HCT 43.1   MCV 94.3*   MCH 31.1*   MCHC 32.9*   RDW 12.9      MPV 8.7       Recent Labs   Lab 07/08/22 2334 07/08/22 2355     --    K 4.9  --    CO2 34*  --    BUN 30.2*  --    CREATININE 0.97  --    CALCIUM 8.9  --    PH  --  7.410   ALBUMIN 3.9  --    ALKPHOS 49  --    ALT 34  --    AST 91*  --    BILITOT 0.7  --        Microbiology Results (last 7 days)     Procedure Component Value Units Date/Time    Blood Culture [[id number]] Collected: 07/08/22 2345    Order Status: Resulted Specimen: Blood from Hand, Left Updated: 07/09/22 0008    Blood Culture [[id number]] Collected: 07/08/22 2334    Order Status: Resulted Specimen: Blood from Antecubital, Right Updated: 07/08/22 2349           CTA Chest Non-Coronary (PE Study)  Narrative: EXAMINATION:  CTA CHEST NON CORONARY    CLINICAL HISTORY:  Pulmonary embolism (PE) suspected, positive D-dimer;    TECHNIQUE:  Helical-acquisition CT images were obtained prior to and following st the intravenous administration of iodine-based contrast media.    The post-contrast images were timed to coincide with arterial opacification for purpose of CT angiography.    Multiplanar reconstructions, to include MIP and volume-rendered (3D) images, ns were accomplished by the CT  technologist at a separate workstation and pushed to PACS for physician review.    Total DLP (mGy-cm) 206 (value may include radiation due to concomitantly performed CT imaging)    Automated tube current modulation and/or weight-based exposure dosing is utilized, when appropriate, to reach lowest reasonably achievable exposure rate.    COMPARISON:  None    FINDINGS:  The heart is normal in size.  The RV to LV ratio is less than 1.  There is no pericardial effusion.  There is no pulmonary embolism identified.    There are patchy and consolidative bilateral airspace opacities right greater than left.  There is trace right pleural effusion.  There is no pneumothorax.    There are no acute findings in the upper abdomen.    There is no acute bony abnormality.  Impression: 1. No pulmonary embolism identified.  2. Bilateral patchy and consolidative airspace opacities with trace right pleural effusion, may be infectious.  No significant change from the [facility] interpretation.    Electronically signed by: Heather Gutierrez  Date:    07/09/2022  Time:    09:02  X-Ray Chest 1 View  Narrative: EXAMINATION:  XR CHEST 1 VIEW    CPT 19524    CLINICAL HISTORY:  shortness of breath;    COMPARISON:  November 16, 2017    FINDINGS:  Examination reveals mediastinal cardiac silhouette to be within normal limits left lung field is clear and free of gross infiltrates atelectasis or effusions.    Slightly more confluent opacities identified in the perihilar region on the right and right infrahilar region early infiltrate cannot be completely excluded.    There is some thickening of the minor fissure.    No other focal consolidative changes  Impression: Slightly more confluent opacities in the right perihilar region and right infrahilar region.    Otherwise no active pulmonary disease    Electronically signed by: Bayron  Pb  Date:    07/09/2022  Time:    08:08        __________________________________________________________________________  INPATIENT LIST OF MEDICATIONS     Scheduled Meds:   albuterol-ipratropium  3 mL Nebulization ED 1 Time    atorvastatin  40 mg Oral Daily    buPROPion  75 mg Oral BID    [START ON 7/10/2022] dexamethasone  6 mg Intravenous Daily    [START ON 7/10/2022] remdesivir infusion  100 mg Intravenous Daily    remdesivir         Continuous Infusions:  PRN Meds:melatonin, sodium chloride 0.9%, sodium chloride 0.9%          ASSESSMENT & PLAN:     Acute hypoxemic respiratory failure due to covid 19  Acute encephalopathy  NSTEMI    Plan    covid protocol remdesivir/decadron  Wean to oxymizer as tolerated  bipap while sleeping  Home meds    GI prophylaxis: protonix  DVT prophylaxis: lovejacquelin Damon MD   07/09/2022

## 2022-07-09 NOTE — ED PROVIDER NOTES
Encounter Date: 7/8/2022       History     Chief Complaint   Patient presents with    Altered Mental Status     Back surgery on Wednesday,discharged to home Thursday. As per wife talking out of his head after taking pain meds with shortness of breath.     67-year-old male had back surgery on Wednesday, three days ago at [facility]  and since that time has had increasing confusion and shortness of breath.  His wife states that she thought that the pain medicines was making him confused but today he seemed like he was breathing hard so she brought in to the emergency room.  At presentation, room air O2 sat was 76%, and temp was a 103°. Pt states his back pain is better. PCP Mariah Mai in [place]        Review of patient's allergies indicates:  No Known Allergies  Past Medical History:   Diagnosis Date    Anxiety     Depression     Hyperlipemia      No past surgical history on file.  No family history on file.  Social History     Tobacco Use    Smoking status: Former Smoker    Smokeless tobacco: Never Used     Review of Systems   Constitutional: Positive for fever.   Respiratory: Positive for shortness of breath.    Psychiatric/Behavioral: Positive for confusion.   All other systems reviewed and are negative.      Physical Exam     Initial Vitals   BP Pulse Resp Temp SpO2   07/08/22 2310 07/08/22 2310 07/08/22 2310 07/08/22 2310 07/08/22 2313   122/62 (!) 116 20 (!) 103 °F (39.4 °C) (!) 78 %      MAP       --                Physical Exam    Nursing note and vitals reviewed.  Constitutional: Vital signs are normal. He appears well-developed and well-nourished. He is diaphoretic.   HENT:   Head: Normocephalic and atraumatic.   Eyes: Pupils are equal, round, and reactive to light.   Neck: Neck supple.   No nuchal rigidity   Cardiovascular: Normal rate, regular rhythm and normal heart sounds.   Pulmonary/Chest: Breath sounds normal. No respiratory distress.   Abdominal: Abdomen is soft. There is no abdominal  tenderness. There is no rebound and no guarding.   Musculoskeletal:      Cervical back: Neck supple. No edema or erythema.      Comments: Back is nontender, small bandages in place, moves all extremities     Neurological: He is alert.   Skin: Skin is warm. No rash noted.   Psychiatric:   Agitated, can not be still, oriented to place but repeatedly asked who I am and why he is here, speech is clear, no focal deficit         ED Course   Procedures  Labs Reviewed   CBC WITH DIFFERENTIAL - Abnormal; Notable for the following components:       Result Value    RBC 4.57 (*)     MCV 94.3 (*)     MCH 31.1 (*)     MCHC 32.9 (*)     IG# 0.05 (*)     All other components within normal limits   B-TYPE NATRIURETIC PEPTIDE - Abnormal; Notable for the following components:    Natriuretic Peptide 139.8 (*)     All other components within normal limits   COMPREHENSIVE METABOLIC PANEL - Abnormal; Notable for the following components:    Chloride 95 (*)     Carbon Dioxide 34 (*)     Blood Urea Nitrogen 30.2 (*)     Aspartate Aminotransferase 91 (*)     All other components within normal limits   D DIMER, QUANTITATIVE - Abnormal; Notable for the following components:    D-Dimer 1.24 (*)     All other components within normal limits   TROPONIN I - Abnormal; Notable for the following components:    Troponin-I 1.734 (*)     All other components within normal limits   COVID/FLU A&B PCR - Abnormal; Notable for the following components:    SARS-CoV-2 PCR Detected (*)     All other components within normal limits   URINALYSIS, REFLEX TO URINE CULTURE - Abnormal; Notable for the following components:    Protein, UA Trace (*)     Blood, UA Moderate (*)     All other components within normal limits   URINALYSIS, MICROSCOPIC - Abnormal; Notable for the following components:    RBC, UA 6-10 (*)     All other components within normal limits   LACTIC ACID, PLASMA - Normal   BLOOD CULTURE [facility]   BLOOD CULTURE [facility]          Imaging Results          CTA  Chest Non-Coronary (PE Study) (In process)                X-Ray Chest 1 View (In process)                  Medications   remdesivir 200 mg in sodium chloride 0.9% 250 mL infusion (0 mg Intravenous Stopped 7/9/22 0221)     Followed by   remdesivir 100 mg in sodium chloride 0.9% 100 mL infusion (has no administration in time range)   remdesivir 100 mg injection SolR (has no administration in time range)   albuterol-ipratropium 2.5 mg-0.5 mg/3 mL nebulizer solution 3 mL (3 mLs Nebulization Given 7/8/22 2355)   ketorolac injection 30 mg (30 mg Intravenous Given 7/9/22 0012)   piperacillin-tazobactam (ZOSYN) 4.5 g in dextrose 5 % in water (D5W) 5 % 100 mL IVPB (MB+) (4.5 g Intravenous New Bag 7/9/22 0027)   furosemide injection 40 mg (40 mg Intravenous Given 7/9/22 0027)   dexamethasone injection 6 mg (6 mg Intravenous Given 7/9/22 0146)   iopamidoL (ISOVUE-370) injection 100 mL (100 mLs Intravenous Given 7/9/22 0203)     Medical Decision Making:   Initial Assessment:   67-year-old male 3 days postop status post back surgery complaining of fever, confusion, sweating, shortness of breath  Differential Diagnosis:   Pneumonia, postoperative infection, PE  Clinical Tests:   Lab Tests: Reviewed  Radiological Study: Reviewed  ED Management:  Patient was given a DuoNeb, dose of Lasix, and his respiratory distress improved.  He was given Zosyn empirically due to the high fever and what appeared to be infiltrates on his chest x-ray.  COVID test came back positive and CT scan shows patchy infiltrates consistent with COVID infection so the Zosyn was not continued.  Patient is being admitted to the hospitalist service for further care.  Other:   I have discussed this case with another health care provider.       <> Summary of the Discussion: Case discussed with Veronica on-call for the hospitalist service.  She asked for the patient to be placed on remdesivir and given Decadron.  She is requesting the telemedicine hospitalist evaluate  "the patient although we have down time from 2-6 a.m. some not sure that will happen.             ED Course as of 07/09/22 0558   Sat Jul 09, 2022   0019 Patient is now crying, saying "my wife does not love me".  His wife is very frustrated because he has been confused and hard to handle at home.  Heart rate is 115, O2 sat is 92% on 2 L but he keeps rolling on his stomach.  No respiratory distress at this time. [SH]   0106 Patient states he is feeling better although he is now diaphoretic, fever seems to be breaking.  Blood pressure 133/65, heart rate 101, temp 100.2°.  He seems a little bit more mentally clear but does keep pulling his oxygen off.  We tried to put him on 2 L per nasal cannula but his O2 sat would drop to 85% so put him back on a Venti mask.  He has no respiratory distress. [SH]   0551 Patient was admitted to the hospitalist service and Epic has been down since 2:00 a.m..  Since that time, he has been resting comfortably although he did desat intermittently to the mid 80s.  He states that at home he is supposed to use BiPAP but he does not use it so we put him on BiPAP in the emergency room.  Since that time, his O2 sat has remained 92-94% and he has been sleeping.  BiPAP was put at 14/7 and 55% FiO2.  Telemedicine Hospitalist has not evaluated the pt (spoke to Dr Alicea) and he says the daytime Hospitalist will need to see the patient. [SH]      ED Course User Index  [SH] Carisa Coyle MD             Clinical Impression:   Final diagnoses:  [R06.02] Shortness of breath  [R41.0] Delirium (Primary)  [U07.1, J12.82] Pneumonia due to COVID-19 virus  [J96.01] Acute hypoxemic respiratory failure  [I21.4] NSTEMI (non-ST elevated myocardial infarction)          ED Disposition Condition    Admit               Carisa Coyle MD  07/09/22 0559    "

## 2022-07-10 LAB
ANION GAP SERPL CALC-SCNC: 9 MEQ/L
BASOPHILS # BLD AUTO: 0.01 X10(3)/MCL (ref 0–0.2)
BASOPHILS NFR BLD AUTO: 0.2 %
BUN SERPL-MCNC: 22.8 MG/DL (ref 8.4–25.7)
CALCIUM SERPL-MCNC: 9.3 MG/DL (ref 8.8–10)
CHLORIDE SERPL-SCNC: 97 MMOL/L (ref 98–107)
CO2 SERPL-SCNC: 36 MMOL/L (ref 23–31)
CREAT SERPL-MCNC: 0.79 MG/DL (ref 0.73–1.18)
CREAT/UREA NIT SERPL: 29
EOSINOPHIL # BLD AUTO: 0.01 X10(3)/MCL (ref 0–0.9)
EOSINOPHIL NFR BLD AUTO: 0.2 %
ERYTHROCYTE [DISTWIDTH] IN BLOOD BY AUTOMATED COUNT: 12.9 % (ref 11.5–17)
GLUCOSE SERPL-MCNC: 174 MG/DL (ref 82–115)
HCT VFR BLD AUTO: 42.1 % (ref 42–52)
HGB BLD-MCNC: 13.7 GM/DL (ref 14–18)
IMM GRANULOCYTES # BLD AUTO: 0.03 X10(3)/MCL (ref 0–0.04)
IMM GRANULOCYTES NFR BLD AUTO: 0.5 %
LYMPHOCYTES # BLD AUTO: 0.43 X10(3)/MCL (ref 0.6–4.6)
LYMPHOCYTES NFR BLD AUTO: 7.4 %
MAGNESIUM SERPL-MCNC: 2.4 MG/DL (ref 1.6–2.6)
MCH RBC QN AUTO: 30.9 PG (ref 27–31)
MCHC RBC AUTO-ENTMCNC: 32.5 MG/DL (ref 33–36)
MCV RBC AUTO: 95 FL (ref 80–94)
MONOCYTES # BLD AUTO: 0.18 X10(3)/MCL (ref 0.1–1.3)
MONOCYTES NFR BLD AUTO: 3.1 %
NEUTROPHILS # BLD AUTO: 5.2 X10(3)/MCL (ref 2.1–9.2)
NEUTROPHILS NFR BLD AUTO: 88.6 %
NRBC BLD AUTO-RTO: 0 %
PHOSPHATE SERPL-MCNC: 2.1 MG/DL (ref 2.3–4.7)
PLATELET # BLD AUTO: 234 X10(3)/MCL (ref 130–400)
PMV BLD AUTO: 8.8 FL (ref 7.4–10.4)
POCT GLUCOSE: 98 MG/DL (ref 70–110)
POTASSIUM SERPL-SCNC: 5.2 MMOL/L (ref 3.5–5.1)
RBC # BLD AUTO: 4.43 X10(6)/MCL (ref 4.7–6.1)
SODIUM SERPL-SCNC: 142 MMOL/L (ref 136–145)
TROPONIN I SERPL-MCNC: 0.65 NG/ML (ref 0–0.04)
TROPONIN I SERPL-MCNC: 0.71 NG/ML (ref 0–0.04)
WBC # SPEC AUTO: 5.8 X10(3)/MCL (ref 4.5–11.5)

## 2022-07-10 PROCEDURE — 11000001 HC ACUTE MED/SURG PRIVATE ROOM

## 2022-07-10 PROCEDURE — 80048 BASIC METABOLIC PNL TOTAL CA: CPT | Performed by: STUDENT IN AN ORGANIZED HEALTH CARE EDUCATION/TRAINING PROGRAM

## 2022-07-10 PROCEDURE — 85025 COMPLETE CBC W/AUTO DIFF WBC: CPT | Performed by: STUDENT IN AN ORGANIZED HEALTH CARE EDUCATION/TRAINING PROGRAM

## 2022-07-10 PROCEDURE — 93010 ELECTROCARDIOGRAM REPORT: CPT | Mod: ,,, | Performed by: INTERNAL MEDICINE

## 2022-07-10 PROCEDURE — 93005 ELECTROCARDIOGRAM TRACING: CPT

## 2022-07-10 PROCEDURE — 84484 ASSAY OF TROPONIN QUANT: CPT | Performed by: STUDENT IN AN ORGANIZED HEALTH CARE EDUCATION/TRAINING PROGRAM

## 2022-07-10 PROCEDURE — 36415 COLL VENOUS BLD VENIPUNCTURE: CPT | Performed by: STUDENT IN AN ORGANIZED HEALTH CARE EDUCATION/TRAINING PROGRAM

## 2022-07-10 PROCEDURE — 27000207 HC ISOLATION

## 2022-07-10 PROCEDURE — 63600175 PHARM REV CODE 636 W HCPCS: Performed by: EMERGENCY MEDICINE

## 2022-07-10 PROCEDURE — 84100 ASSAY OF PHOSPHORUS: CPT | Performed by: STUDENT IN AN ORGANIZED HEALTH CARE EDUCATION/TRAINING PROGRAM

## 2022-07-10 PROCEDURE — 27000221 HC OXYGEN, UP TO 24 HOURS

## 2022-07-10 PROCEDURE — 25000003 PHARM REV CODE 250: Performed by: INTERNAL MEDICINE

## 2022-07-10 PROCEDURE — 63600175 PHARM REV CODE 636 W HCPCS: Mod: TB | Performed by: INTERNAL MEDICINE

## 2022-07-10 PROCEDURE — 83735 ASSAY OF MAGNESIUM: CPT | Performed by: STUDENT IN AN ORGANIZED HEALTH CARE EDUCATION/TRAINING PROGRAM

## 2022-07-10 PROCEDURE — 93010 EKG 12-LEAD: ICD-10-PCS | Mod: ,,, | Performed by: INTERNAL MEDICINE

## 2022-07-10 RX ORDER — TIZANIDINE 4 MG/1
4 TABLET ORAL EVERY 8 HOURS PRN
Status: DISCONTINUED | OUTPATIENT
Start: 2022-07-10 | End: 2022-07-11 | Stop reason: HOSPADM

## 2022-07-10 RX ORDER — POLYETHYLENE GLYCOL 3350 17 G/17G
17 POWDER, FOR SOLUTION ORAL DAILY
Status: DISCONTINUED | OUTPATIENT
Start: 2022-07-10 | End: 2022-07-11 | Stop reason: HOSPADM

## 2022-07-10 RX ORDER — POLYETHYLENE GLYCOL 3350 17 G/17G
17 POWDER, FOR SOLUTION ORAL DAILY
Status: DISCONTINUED | OUTPATIENT
Start: 2022-07-11 | End: 2022-07-10

## 2022-07-10 RX ORDER — IPRATROPIUM BROMIDE AND ALBUTEROL SULFATE 2.5; .5 MG/3ML; MG/3ML
3 SOLUTION RESPIRATORY (INHALATION) EVERY 8 HOURS
Status: DISCONTINUED | OUTPATIENT
Start: 2022-07-10 | End: 2022-07-11 | Stop reason: HOSPADM

## 2022-07-10 RX ADMIN — DEXAMETHASONE SODIUM PHOSPHATE 6 MG: 4 INJECTION, SOLUTION INTRA-ARTICULAR; INTRALESIONAL; INTRAMUSCULAR; INTRAVENOUS; SOFT TISSUE at 10:07

## 2022-07-10 RX ADMIN — HYDROCODONE BITARTRATE AND ACETAMINOPHEN 1 TABLET: 10; 325 TABLET ORAL at 03:07

## 2022-07-10 RX ADMIN — TIZANIDINE 4 MG: 4 TABLET ORAL at 10:07

## 2022-07-10 RX ADMIN — BUPROPION HYDROCHLORIDE 75 MG: 75 TABLET, FILM COATED ORAL at 09:07

## 2022-07-10 RX ADMIN — HYDROCODONE BITARTRATE AND ACETAMINOPHEN 1 TABLET: 10; 325 TABLET ORAL at 06:07

## 2022-07-10 RX ADMIN — ATORVASTATIN CALCIUM 40 MG: 40 TABLET, FILM COATED ORAL at 10:07

## 2022-07-10 RX ADMIN — HYDROCODONE BITARTRATE AND ACETAMINOPHEN 1 TABLET: 10; 325 TABLET ORAL at 10:07

## 2022-07-10 RX ADMIN — ENOXAPARIN SODIUM 40 MG: 100 INJECTION SUBCUTANEOUS at 05:07

## 2022-07-10 RX ADMIN — BUPROPION HYDROCHLORIDE 75 MG: 75 TABLET, FILM COATED ORAL at 10:07

## 2022-07-10 RX ADMIN — PANTOPRAZOLE SODIUM 40 MG: 40 TABLET, DELAYED RELEASE ORAL at 10:07

## 2022-07-10 RX ADMIN — REMDESIVIR 100 MG: 100 INJECTION, POWDER, LYOPHILIZED, FOR SOLUTION INTRAVENOUS at 10:07

## 2022-07-10 RX ADMIN — TIZANIDINE 4 MG: 4 TABLET ORAL at 09:07

## 2022-07-10 NOTE — PROGRESS NOTES
[facility]  Hospital Medicine Progress Note        Chief Complaint: AMS     HPI:   67 y.o. male who  has a past medical history of Anxiety, Depression, and Hyperlipemia.. The patient presented to [facility] on 7/8/2022 with a primary complaint of confusion.  Pt. Had back surgery 7/6 which went fine and was discharged next day.  After getting home Thursday afternoon he became agitated confused. Back pain was controlled, wife called md instructing them to decrease pain med and muscle relaxer in half.  Confusion persisted, pt begin to have low grade fever persisted into Friday.  He also developed a mild cough with occasional thick brown mucus and sweats. Pt presented to ER for evaluation.  In ER patient remained confused, O2 70s% bipap was placed, occasionally pt pulls off mask.  Oxygen saturation has improved and pt. Mental status is clearing upon my evaluation.  He appears comfortable answering question appropriately. He is unvaccinated and has never had covid infection.  He says he felt a mild sinus drip Monday Tuesday but wednesday felt fine. Workup covid +, cardiac enzymes + denies cp. CT neg. For PE, b/l patchy and consolidative airspace opacities.pt has a bipap at home but says does not wear, does not tolerate.    Interval Hx:   No new events. No overnight complaints.  No family present at bedside.    Objective/physical exam:  General: Appears comfortable, no acute distress.  Integumentary: Warm, dry, intact.  Neuro:  Alert awake oriented , logical thought process, comprehension intact   Musculoskeletal: Purposeful movement noted.   Respiratory: No accessory muscle use. Breath sounds are equal.  Cardiovascular: Regular rate. No peripheral edema.    VITAL SIGNS: 24 HRS MIN & MAX LAST   Temp  Min: 97.5 °F (36.4 °C)  Max: 100 °F (37.8 °C) 98.2 °F (36.8 °C)   BP  Min: 116/76  Max: 175/97 116/76   Pulse  Min: 74  Max: 101  74   Resp  Min: 16  Max: 20 18   SpO2  Min: 91 %  Max: 97 % 97 %      X-ray Chest PA And Lateral  Narrative: EXAMINATION:  XR CHEST PA AND LATERAL    CLINICAL HISTORY:  Shortness of breath;    TECHNIQUE:  PA and lateral views of the chest were performed.    COMPARISON:  07/08/2022    FINDINGS:  Heart size enlarged the pulmonary vasculature is congested.  Decreased aeration with more bandlike appearance of the right lower lobe.  Interseptal thickening is identified with Kerley B lines.  No evidence for effusion.  Impression: Increasing interstitial edema.    Electronically signed by: Fito Almaraz MD  Date:    07/09/2022  Time:    17:20  CTA Chest Non-Coronary (PE Study)  Narrative: EXAMINATION:  CTA CHEST NON CORONARY    CLINICAL HISTORY:  Pulmonary embolism (PE) suspected, positive D-dimer;    TECHNIQUE:  Helical-acquisition CT images were obtained prior to and following st the intravenous administration of iodine-based contrast media.    The post-contrast images were timed to coincide with arterial opacification for purpose of CT angiography.    Multiplanar reconstructions, to include MIP and volume-rendered (3D) images, ns were accomplished by the CT technologist at a separate workstation and pushed to PACS for physician review.    Total DLP (mGy-cm) 206 (value may include radiation due to concomitantly performed CT imaging)    Automated tube current modulation and/or weight-based exposure dosing is utilized, when appropriate, to reach lowest reasonably achievable exposure rate.    COMPARISON:  None    FINDINGS:  The heart is normal in size.  The RV to LV ratio is less than 1.  There is no pericardial effusion.  There is no pulmonary embolism identified.    There are patchy and consolidative bilateral airspace opacities right greater than left.  There is trace right pleural effusion.  There is no pneumothorax.    There are no acute findings in the upper abdomen.    There is no acute bony abnormality.  Impression: 1. No pulmonary embolism identified.  2. Bilateral patchy and  consolidative airspace opacities with trace right pleural effusion, may be infectious.  No significant change from the [facility] interpretation.    Electronically signed by: Heather Gutierrez  Date:    07/09/2022  Time:    09:02  X-Ray Chest 1 View  Narrative: EXAMINATION:  XR CHEST 1 VIEW    CPT 36462    CLINICAL HISTORY:  shortness of breath;    COMPARISON:  November 16, 2017    FINDINGS:  Examination reveals mediastinal cardiac silhouette to be within normal limits left lung field is clear and free of gross infiltrates atelectasis or effusions.    Slightly more confluent opacities identified in the perihilar region on the right and right infrahilar region early infiltrate cannot be completely excluded.    There is some thickening of the minor fissure.    No other focal consolidative changes  Impression: Slightly more confluent opacities in the right perihilar region and right infrahilar region.    Otherwise no active pulmonary disease    Electronically signed by: Bayron Ambriz  Date:    07/09/2022  Time:    08:08    Recent Labs   Lab 07/08/22  2334 07/09/22  0726 07/10/22  1433   WBC 8.7 8.6 5.8   RBC 4.57* 4.91 4.43*   HGB 14.2 15.1 13.7*   HCT 43.1 45.8 42.1   MCV 94.3* 93.3 95.0*   MCH 31.1* 30.8 30.9   MCHC 32.9* 33.0 32.5*   RDW 12.9 13.0 12.9    180 234   MPV 8.7 9.1 8.8       Recent Labs   Lab 07/08/22 2334 07/08/22  2355 07/09/22  0726 07/10/22  1433     --  138 142   K 4.9  --  5.2* 5.2*   CO2 34*  --  32* 36*   BUN 30.2*  --  27.1* 22.8   CREATININE 0.97  --  0.95 0.79   CALCIUM 8.9  --  9.1 9.3   PH  --  7.410  --   --    MG  --   --   --  2.40   ALBUMIN 3.9  --  3.7  --    ALKPHOS 49  --  50  --    ALT 34  --  32  --    AST 91*  --  84*  --    BILITOT 0.7  --  0.7  --           Microbiology Results (last 7 days)     Procedure Component Value Units Date/Time    Blood Culture [[id number]]  (Normal) Collected: 07/08/22 1443    Order Status: Completed Specimen: Blood from Antecubital,  Right Updated: 07/10/22 1102     CULTURE, BLOOD ([facility]) No Growth At 24 Hours    Blood Culture [[id number]]  (Normal) Collected: 07/08/22 5735    Order Status: Completed Specimen: Blood from Hand, Left Updated: 07/10/22 1102     CULTURE, BLOOD ([facility]) No Growth At 24 Hours           See below for Radiology    Scheduled Med:   atorvastatin  40 mg Oral Daily    buPROPion  75 mg Oral BID    dexamethasone  6 mg Intravenous Daily    enoxaparin  40 mg Subcutaneous Daily    pantoprazole  40 mg Oral Daily    remdesivir infusion  100 mg Intravenous Daily          PRN Meds:  HYDROcodone-acetaminophen, melatonin, sodium chloride 0.9%, sodium chloride 0.9%, tiZANidine     Nutrition Status:  Diet diabetic: Diabetic     Assessment/Plan:  Acute hypoxemic respiratory failure due to covid 19  Acute encephalopathy  NSTEMI-stable.     Plan  COVID 19 positive---requiring supplemental oxygen as tolerated, continue supportive care,  Remesdivir to be continued 4 day course, continue or dexamethasone for 5-10 days depending on medical stability.  Encourage use of incentive spirometer, add Robitussin, Mucinex and DuoNeb treatments p.r.n.  Lovenox for DVT prophylaxis    Troponins trending down; asymptomatic  Reviewed and restarted appropriate home medications.   Mild hyperkalemia-s/p lasix 20mg IV ordered,asymptomatic, recheck blood chemistry.   Mild lpnr-[id number]-bmhydnr and recheck in the a.m.    This is a critical care document  Critical Care Diagnosis:acute hypoxic respiratory failure.   Critical care interventions: hands on evaluation, review of labs/radiographs/records and discussions; assessing and managing the high probability of imminent or life-threatening deterioration of cardiorespiratory status.  Critical care time spent > 40 minutes.     Anticipated discharge and Disposition:  Pending.     All diagnosis and differential diagnosis have been reviewed; assessment and plan has been documented; I have personally reviewed the  labs and test results that are presently available; I have reviewed the patients medication list; I have reviewed the consulting providers response and recommendations. I have reviewed or attempted to review medical records based upon their availability    All of the patient's questions have been  addressed and answered. Patient's is agreeable to the above stated plan.   I will continue to monitor closely and make adjustments to medical management as needed.      Cee Choudhury, DO   07/10/2022        This note was created with the assistance of Dragon voice recognition software. There may be transcription errors as a result of using this technology however minimal. Effort has been made to assure accuracy of transcription but any obvious errors or omissions should be clarified with the author of the document.

## 2022-07-11 VITALS
BODY MASS INDEX: 34.74 KG/M2 | OXYGEN SATURATION: 95 % | DIASTOLIC BLOOD PRESSURE: 78 MMHG | HEART RATE: 80 BPM | HEIGHT: 61 IN | SYSTOLIC BLOOD PRESSURE: 159 MMHG | WEIGHT: 184 LBS | RESPIRATION RATE: 18 BRPM | TEMPERATURE: 99 F

## 2022-07-11 LAB
ANION GAP SERPL CALC-SCNC: 8 MEQ/L
BUN SERPL-MCNC: 17.4 MG/DL (ref 8.4–25.7)
CALCIUM SERPL-MCNC: 9.2 MG/DL (ref 8.8–10)
CHLORIDE SERPL-SCNC: 100 MMOL/L (ref 98–107)
CO2 SERPL-SCNC: 33 MMOL/L (ref 23–31)
CREAT SERPL-MCNC: 0.77 MG/DL (ref 0.73–1.18)
CREAT/UREA NIT SERPL: 23
GLUCOSE SERPL-MCNC: 161 MG/DL (ref 82–115)
HCO3 UR-SCNC: 36.4 MMOL/L (ref 24–28)
MAGNESIUM SERPL-MCNC: 2.1 MG/DL (ref 1.6–2.6)
PCO2 BLDA: 57.4 MMHG (ref 35–45)
PH SMN: 7.41 [PH] (ref 7.35–7.45)
PHOSPHATE SERPL-MCNC: 2.7 MG/DL (ref 2.3–4.7)
PO2 BLDA: 158 MMHG (ref 80–100)
POC BE: 12 MMOL/L
POC SATURATED O2: 99 % (ref 95–100)
POC TCO2: 38 MMOL/L (ref 23–27)
POTASSIUM SERPL-SCNC: 4.4 MMOL/L (ref 3.5–5.1)
SAMPLE: ABNORMAL
SODIUM SERPL-SCNC: 141 MMOL/L (ref 136–145)
TROPONIN I SERPL-MCNC: 0.45 NG/ML (ref 0–0.04)
TROPONIN I SERPL-MCNC: 0.58 NG/ML (ref 0–0.04)

## 2022-07-11 PROCEDURE — 80048 BASIC METABOLIC PNL TOTAL CA: CPT | Performed by: STUDENT IN AN ORGANIZED HEALTH CARE EDUCATION/TRAINING PROGRAM

## 2022-07-11 PROCEDURE — 94761 N-INVAS EAR/PLS OXIMETRY MLT: CPT

## 2022-07-11 PROCEDURE — 25000003 PHARM REV CODE 250: Performed by: INTERNAL MEDICINE

## 2022-07-11 PROCEDURE — 94640 AIRWAY INHALATION TREATMENT: CPT

## 2022-07-11 PROCEDURE — 84484 ASSAY OF TROPONIN QUANT: CPT | Performed by: STUDENT IN AN ORGANIZED HEALTH CARE EDUCATION/TRAINING PROGRAM

## 2022-07-11 PROCEDURE — 84100 ASSAY OF PHOSPHORUS: CPT | Performed by: STUDENT IN AN ORGANIZED HEALTH CARE EDUCATION/TRAINING PROGRAM

## 2022-07-11 PROCEDURE — 25000242 PHARM REV CODE 250 ALT 637 W/ HCPCS: Performed by: STUDENT IN AN ORGANIZED HEALTH CARE EDUCATION/TRAINING PROGRAM

## 2022-07-11 PROCEDURE — 83735 ASSAY OF MAGNESIUM: CPT | Performed by: STUDENT IN AN ORGANIZED HEALTH CARE EDUCATION/TRAINING PROGRAM

## 2022-07-11 PROCEDURE — 36415 COLL VENOUS BLD VENIPUNCTURE: CPT | Performed by: STUDENT IN AN ORGANIZED HEALTH CARE EDUCATION/TRAINING PROGRAM

## 2022-07-11 RX ADMIN — TIZANIDINE 4 MG: 4 TABLET ORAL at 05:07

## 2022-07-11 RX ADMIN — IPRATROPIUM BROMIDE AND ALBUTEROL SULFATE 3 ML: 2.5; .5 SOLUTION RESPIRATORY (INHALATION) at 01:07

## 2022-07-14 LAB
BACTERIA BLD CULT: NORMAL
BACTERIA BLD CULT: NORMAL

## 2022-07-28 NOTE — DISCHARGE SUMMARY
[facility]  Hospital Medicine Discharge Summary      This patient left against medical advise. I did not see him on the day of discharge.    Admit Date: 7/8/2022  Discharge Date: 7/11/22 (left against medical advise)  Admitting Physician: [unfilled]  Discharging Physician: Cee Choudhury DO.  Primary Care Physician: Primary Doctor No    Discharge Diagnoses:  Acute hypoxemic respiratory failure due to covid 19  NSTEMI-stable.     Hospital Course:   67 y.o. male who  has a past medical history of Anxiety, Depression, and Hyperlipemia.. The patient presented to [facility] on 7/8/2022 with a primary complaint of confusion.  Pt. Had back surgery 7/6 which went fine and was discharged next day.  After getting home Thursday afternoon he became agitated confused. Back pain was controlled, wife called md instructing them to decrease pain med and muscle relaxer in half.  Confusion persisted, pt begin to have low grade fever persisted into Friday.  He also developed a mild cough with occasional thick brown mucus and sweats. Pt presented to ER for evaluation.  In ER patient remained confused, O2 70s% bipap was placed, occasionally pt pulls off mask.  Oxygen saturation has improved and pt. Mental status is clearing upon my evaluation.  He appears comfortable answering question appropriately. He is unvaccinated and has never had covid infection.  He says he felt a mild sinus drip Monday Tuesday but wednesday felt fine. Workup covid +, cardiac enzymes + denies cp. CT neg. For PE, b/l patchy and consolidative airspace opacities.pt has a bipap at home but says does not wear, does not tolerate.     Procedures Performed: No admission procedures for hospital encounter.     Significant Diagnostic Studies: See Full reports for all details    No results for input(s): WBC, RBC, HGB, HCT, MCV, MCH, MCHC, RDW, PLT, MPV, GRAN, LYMPH, MONO, BASO, NRBC in the last 168 hours.    No results for input(s): NA, K, CL,  CO2, ANIONGAP, BUN, CREATININE, GLU, CALCIUM, PH, MG, ALBUMIN, PROT, ALKPHOS, ALT, AST, BILITOT in the last 168 hours.     Microbiology Results (last 7 days)     ** No results found for the last 168 hours. **           X-ray Chest PA And Lateral  Narrative: EXAMINATION:  XR CHEST PA AND LATERAL    CLINICAL HISTORY:  Shortness of breath;    TECHNIQUE:  PA and lateral views of the chest were performed.    COMPARISON:  07/08/2022    FINDINGS:  Heart size enlarged the pulmonary vasculature is congested.  Decreased aeration with more bandlike appearance of the right lower lobe.  Interseptal thickening is identified with Kerley B lines.  No evidence for effusion.  Impression: Increasing interstitial edema.    Electronically signed by: Fito Almaraz MD  Date:    07/09/2022  Time:    17:20  CTA Chest Non-Coronary (PE Study)  Narrative: EXAMINATION:  CTA CHEST NON CORONARY    CLINICAL HISTORY:  Pulmonary embolism (PE) suspected, positive D-dimer;    TECHNIQUE:  Helical-acquisition CT images were obtained prior to and following st the intravenous administration of iodine-based contrast media.    The post-contrast images were timed to coincide with arterial opacification for purpose of CT angiography.    Multiplanar reconstructions, to include MIP and volume-rendered (3D) images, ns were accomplished by the CT technologist at a separate workstation and pushed to PACS for physician review.    Total DLP (mGy-cm) 206 (value may include radiation due to concomitantly performed CT imaging)    Automated tube current modulation and/or weight-based exposure dosing is utilized, when appropriate, to reach lowest reasonably achievable exposure rate.    COMPARISON:  None    FINDINGS:  The heart is normal in size.  The RV to LV ratio is less than 1.  There is no pericardial effusion.  There is no pulmonary embolism identified.    There are patchy and consolidative bilateral airspace opacities right greater than left.  There is trace right  pleural effusion.  There is no pneumothorax.    There are no acute findings in the upper abdomen.    There is no acute bony abnormality.  Impression: 1. No pulmonary embolism identified.  2. Bilateral patchy and consolidative airspace opacities with trace right pleural effusion, may be infectious.  No significant change from the [facility] interpretation.    Electronically signed by: Heather Gutierrez  Date:    07/09/2022  Time:    09:02  X-Ray Chest 1 View  Narrative: EXAMINATION:  XR CHEST 1 VIEW    CPT 12430    CLINICAL HISTORY:  shortness of breath;    COMPARISON:  November 16, 2017    FINDINGS:  Examination reveals mediastinal cardiac silhouette to be within normal limits left lung field is clear and free of gross infiltrates atelectasis or effusions.    Slightly more confluent opacities identified in the perihilar region on the right and right infrahilar region early infiltrate cannot be completely excluded.    There is some thickening of the minor fissure.    No other focal consolidative changes  Impression: Slightly more confluent opacities in the right perihilar region and right infrahilar region.    Otherwise no active pulmonary disease    Electronically signed by: Bayron Ambriz  Date:    07/09/2022  Time:    08:08         Medication List      ASK your doctor about these medications    atorvastatin 40 MG tablet  Commonly known as: LIPITOR     * buPROPion 75 MG tablet  Commonly known as: WELLBUTRIN     * buPROPion 150 MG TB24 tablet  Commonly known as: WELLBUTRIN XL     chlorproMAZINE 25 MG tablet  Commonly known as: THORAZINE     citalopram 40 MG tablet  Commonly known as: CeleXA     HYDROcodone-acetaminophen  mg per tablet  Commonly known as: NORCO     pravastatin 40 MG tablet  Commonly known as: PRAVACHOL     testosterone cypionate 200 mg/mL injection  Commonly known as: DEPOTESTOTERONE CYPIONATE     tiZANidine 4 MG tablet  Commonly known as: ZANAFLEX         * This list has 2 medication(s) that  are the same as other medications prescribed for you. Read the directions carefully, and ask your doctor or other care provider to review them with you.               \Discharge Disposition: Left Against Medical Advice   \

## 2022-10-14 DIAGNOSIS — R19.5 ABNORMAL FECES: Primary | ICD-10-CM

## 2022-10-17 ENCOUNTER — CLINICAL SUPPORT (OUTPATIENT)
Dept: RESPIRATORY THERAPY | Facility: HOSPITAL | Age: 67
End: 2022-10-17
Attending: ANESTHESIOLOGY
Payer: MEDICARE

## 2022-10-17 ENCOUNTER — ANESTHESIA EVENT (OUTPATIENT)
Dept: SURGERY | Facility: HOSPITAL | Age: 67
End: 2022-10-17
Payer: MEDICARE

## 2022-10-17 DIAGNOSIS — R19.5 ABNORMAL FECES: ICD-10-CM

## 2022-10-17 PROCEDURE — 93010 ELECTROCARDIOGRAM REPORT: CPT | Mod: ,,, | Performed by: INTERNAL MEDICINE

## 2022-10-17 PROCEDURE — 93010 EKG 12-LEAD: ICD-10-PCS | Mod: ,,, | Performed by: INTERNAL MEDICINE

## 2022-10-17 PROCEDURE — 93005 ELECTROCARDIOGRAM TRACING: CPT

## 2022-10-17 RX ORDER — FLUTICASONE PROPIONATE 50 MCG
1 SPRAY, SUSPENSION (ML) NASAL DAILY PRN
COMMUNITY
Start: 2022-10-10

## 2022-10-17 NOTE — ANESTHESIA PREPROCEDURE EVALUATION
10/17/2022  Arsh Damon is a 67 y.o., male.      Pre-op Assessment    I have reviewed the Patient Summary Reports.     I have reviewed the Nursing Notes. I have reviewed the NPO Status.   I have reviewed the Medications.     Review of Systems  Anesthesia Hx:  Denies Family Hx of Anesthesia complications.   Denies Personal Hx of Anesthesia complications.   Social:  Non-Smoker, No Alcohol Use    Hematology/Oncology:  Hematology Normal   Oncology Normal     EENT/Dental:EENT/Dental Normal   Cardiovascular:  Cardiovascular Normal  ECG has been reviewed.    Pulmonary:   Sleep Apnea    Renal/:  Renal/ Normal     Hepatic/GI:  Hepatic/GI Normal    Musculoskeletal:  Musculoskeletal Normal    Neurological:  Neurology Normal    Endocrine:  Endocrine Normal    Dermatological:  Skin Normal    Psych:   Psychiatric History          Physical Exam  General: Cooperative, Alert and Oriented    Airway:  Mallampati: III   Mouth Opening: Normal  TM Distance: Normal  Tongue: Large  Neck ROM: Extension Decreased, Flexion Decreased    Dental:  Intact        Anesthesia Plan  Type of Anesthesia, risks & benefits discussed:    Anesthesia Type: Gen Natural Airway  Intra-op Monitoring Plan: Standard ASA Monitors  Post Op Pain Control Plan:   (medical reason for not using multimodal pain management)  Induction:  IV  Informed Consent: Informed consent signed with the Patient and all parties understand the risks and agree with anesthesia plan.  All questions answered. Patient consented to blood products? Yes  ASA Score: 2    Ready For Surgery From Anesthesia Perspective.     .

## 2022-10-18 ENCOUNTER — HOSPITAL ENCOUNTER (OUTPATIENT)
Facility: HOSPITAL | Age: 67
Discharge: HOME | End: 2022-10-18
Attending: SURGERY | Admitting: SURGERY
Payer: MEDICARE

## 2022-10-18 ENCOUNTER — ANESTHESIA (OUTPATIENT)
Dept: SURGERY | Facility: HOSPITAL | Age: 67
End: 2022-10-18
Payer: MEDICARE

## 2022-10-18 VITALS
HEART RATE: 75 BPM | WEIGHT: 183 LBS | RESPIRATION RATE: 18 BRPM | OXYGEN SATURATION: 95 % | DIASTOLIC BLOOD PRESSURE: 82 MMHG | TEMPERATURE: 96 F | BODY MASS INDEX: 34.57 KG/M2 | SYSTOLIC BLOOD PRESSURE: 143 MMHG

## 2022-10-18 DIAGNOSIS — R19.5 HEME POSITIVE STOOL: ICD-10-CM

## 2022-10-18 DIAGNOSIS — Z12.11 SCREEN FOR COLON CANCER: ICD-10-CM

## 2022-10-18 DIAGNOSIS — D12.5 ADENOMATOUS POLYP OF SIGMOID COLON: Primary | ICD-10-CM

## 2022-10-18 PROBLEM — K63.5 SIGMOID POLYP: Status: ACTIVE | Noted: 2022-10-18

## 2022-10-18 LAB — POCT GLUCOSE: 51 MG/DL (ref 70–110)

## 2022-10-18 PROCEDURE — 88305 TISSUE EXAM BY PATHOLOGIST: CPT | Performed by: SURGERY

## 2022-10-18 PROCEDURE — 27201423 OPTIME MED/SURG SUP & DEVICES STERILE SUPPLY: Performed by: SURGERY

## 2022-10-18 PROCEDURE — 25000003 PHARM REV CODE 250: Performed by: NURSE ANESTHETIST, CERTIFIED REGISTERED

## 2022-10-18 PROCEDURE — 63600175 PHARM REV CODE 636 W HCPCS: Performed by: ANESTHESIOLOGY

## 2022-10-18 PROCEDURE — 25000003 PHARM REV CODE 250: Performed by: SURGERY

## 2022-10-18 PROCEDURE — 37000009 HC ANESTHESIA EA ADD 15 MINS: Performed by: SURGERY

## 2022-10-18 PROCEDURE — 45384 COLONOSCOPY W/LESION REMOVAL: CPT | Performed by: SURGERY

## 2022-10-18 PROCEDURE — 37000008 HC ANESTHESIA 1ST 15 MINUTES: Performed by: SURGERY

## 2022-10-18 PROCEDURE — 63600175 PHARM REV CODE 636 W HCPCS: Performed by: NURSE ANESTHETIST, CERTIFIED REGISTERED

## 2022-10-18 RX ORDER — PROPOFOL 10 MG/ML
VIAL (ML) INTRAVENOUS
Status: DISCONTINUED | OUTPATIENT
Start: 2022-10-18 | End: 2022-10-18

## 2022-10-18 RX ORDER — SODIUM CHLORIDE 9 MG/ML
INJECTION, SOLUTION INTRAVENOUS CONTINUOUS
Status: DISCONTINUED | OUTPATIENT
Start: 2022-10-18 | End: 2022-10-18 | Stop reason: HOSPADM

## 2022-10-18 RX ORDER — LIDOCAINE HYDROCHLORIDE 20 MG/ML
INJECTION, SOLUTION EPIDURAL; INFILTRATION; INTRACAUDAL; PERINEURAL
Status: DISCONTINUED | OUTPATIENT
Start: 2022-10-18 | End: 2022-10-18

## 2022-10-18 RX ORDER — SODIUM CHLORIDE, SODIUM LACTATE, POTASSIUM CHLORIDE, CALCIUM CHLORIDE 600; 310; 30; 20 MG/100ML; MG/100ML; MG/100ML; MG/100ML
INJECTION, SOLUTION INTRAVENOUS CONTINUOUS
Status: ACTIVE | OUTPATIENT
Start: 2022-10-18

## 2022-10-18 RX ADMIN — PROPOFOL 50 MG: 10 INJECTION, EMULSION INTRAVENOUS at 07:10

## 2022-10-18 RX ADMIN — LIDOCAINE HYDROCHLORIDE 60 MG: 20 INJECTION, SOLUTION EPIDURAL; INFILTRATION; INTRACAUDAL; PERINEURAL at 07:10

## 2022-10-18 RX ADMIN — PROPOFOL 100 MG: 10 INJECTION, EMULSION INTRAVENOUS at 07:10

## 2022-10-18 RX ADMIN — SODIUM CHLORIDE, POTASSIUM CHLORIDE, SODIUM LACTATE AND CALCIUM CHLORIDE: 600; 310; 30; 20 INJECTION, SOLUTION INTRAVENOUS at 06:10

## 2022-10-18 RX ADMIN — PROPOFOL 30 MG: 10 INJECTION, EMULSION INTRAVENOUS at 08:10

## 2022-10-18 RX ADMIN — PROPOFOL 50 MG: 10 INJECTION, EMULSION INTRAVENOUS at 08:10

## 2022-10-18 RX ADMIN — DEXTROSE MONOHYDRATE 12.5 G: 25 INJECTION, SOLUTION INTRAVENOUS at 06:10

## 2022-10-18 NOTE — OP NOTE
Procedure date:  10/18/2022    Indications: 67 y.o. male In need to 1st age-appropriate screening colonoscopy with recent fecal occult blood testing    Preoperative diagnosis:  1.  Rectal bleeding fecal occult blood testing positive     Postoperative diagnosis:  1.  External hemorrhoids 2.  Clustering of adenomatous appearing polyps within the distal descending and sigmoid colon    Procedure performed:1.  Colonoscopy with hot forceps biopsy    Procedure in detail:  Patient was brought to the endoscopy suite laid in a left lateral decubitus position right side up.  Intravenous anesthesia was provided.  Digital rectal exam performed exhibiting good anorectal tone and no masses.  The endoscope was then passed through the anus intubating the rectum and with gentle insufflation reaching the cecum.  Upon reaching the cecum pictures are taken the scope was then slowly withdrawn.  Overall the cecum ascending transverse descending colon all appeared to be grossly normal without mass polyp or ulcerations seen.  Within the sigmoid and rectal regions extending from 50 cm to 20 cm there was a large clustering of polyps with sessile appearance.  Biopsies performed.  Ink injection was attempted however patient has sleep apnea cause significant motion during colonoscopy and I was unable to complete the appropriate injection spot..  Scope was then retroflexed in the distal rectum revealing normal-appearing perianal mucosa and moderate-sized external hemorrhoids.  It was then returned to neutral position and the colon was evacuated of air.  The patient was then relieved of anesthesia stable condition and transferred to postanesthesia care unit.    Specimens:  Sigmoid polyp biopsy at 40 cm from the anal verge    Findings:1.  Clustering of sessile adenomatous polyps within the sigmoid and rectal regions extending 50-20 cm from the anal verge.    Complications:  None    Recommendations:  Repeat colonoscopy recommendation be made  following pathologic review.  Patient will need submucosal resection completion colonoscopy for polyp resection within the rectosigmoid regions.    Disposition: Upon completion of colonoscopy patient be discharged to home with follow-up surgery clinic in 1 week      Agnieszka Olivarez MD

## 2022-10-18 NOTE — ANESTHESIA POSTPROCEDURE EVALUATION
Anesthesia Post Evaluation    Patient: Arsh Damon    Procedure(s) Performed: Procedure(s) (LRB):  COLONOSCOPY (N/A)  COLONOSCOPY, WITH POLYPECTOMY USING SNARE (N/A)    Final Anesthesia Type: general      Patient participation: Yes- Able to Participate  Level of consciousness: awake and alert and oriented  Post-procedure vital signs: reviewed and stable  Pain management: adequate  Airway patency: patent    PONV status at discharge: No PONV  Anesthetic complications: no      Cardiovascular status: stable  Respiratory status: unassisted, spontaneous ventilation and room air  Hydration status: euvolemic  Follow-up not needed.          Vitals Value Taken Time   /79 10/18/22 0615   Temp 35.8 °C (96.4 °F) 10/18/22 0615   Pulse 70 10/18/22 0615   Resp 18 10/18/22 0615   SpO2 95 % 10/18/22 0615         No case tracking events are documented in the log.      Pain/Dexter Score: No data recorded

## 2022-10-19 LAB
ESTROGEN SERPL-MCNC: NORMAL PG/ML
INSULIN SERPL-ACNC: NORMAL U[IU]/ML
LAB AP CLINICAL INFORMATION: NORMAL
LAB AP GROSS DESCRIPTION: NORMAL
LAB AP REPORT FOOTNOTES: NORMAL
T3RU NFR SERPL: NORMAL %

## 2023-03-28 ENCOUNTER — HOSPITAL ENCOUNTER (OUTPATIENT)
Dept: RADIOLOGY | Facility: HOSPITAL | Age: 68
Discharge: HOME | End: 2023-03-28
Attending: FAMILY MEDICINE
Payer: MEDICARE

## 2023-03-28 DIAGNOSIS — J40 BRONCHITIS: ICD-10-CM

## 2023-03-28 PROCEDURE — 71046 X-RAY EXAM CHEST 2 VIEWS: CPT | Mod: TC

## 2023-09-12 DIAGNOSIS — Z87.891 PERSONAL HISTORY OF TOBACCO USE, PRESENTING HAZARDS TO HEALTH: Primary | ICD-10-CM

## 2023-09-22 ENCOUNTER — HOSPITAL ENCOUNTER (OUTPATIENT)
Dept: RADIOLOGY | Facility: HOSPITAL | Age: 68
Discharge: HOME | End: 2023-09-22
Attending: FAMILY MEDICINE
Payer: MEDICARE

## 2023-09-22 DIAGNOSIS — Z87.891 PERSONAL HISTORY OF TOBACCO USE, PRESENTING HAZARDS TO HEALTH: ICD-10-CM

## 2023-09-22 PROCEDURE — 76706 US ABDL AORTA SCREEN AAA: CPT | Mod: TC

## 2025-01-02 ENCOUNTER — HOSPITAL ENCOUNTER (OUTPATIENT)
Dept: RADIOLOGY | Facility: HOSPITAL | Age: 70
Discharge: HOME | End: 2025-01-02
Attending: FAMILY MEDICINE
Payer: MEDICARE

## 2025-01-02 DIAGNOSIS — J41.0 SIMPLE CHRONIC BRONCHITIS: ICD-10-CM

## 2025-01-02 PROCEDURE — 71046 X-RAY EXAM CHEST 2 VIEWS: CPT | Mod: TC

## (undated) DEVICE — KIT SURGICAL COLON .25 1.1OZ

## (undated) DEVICE — FORCEP CAPTURA PRO SPK 230CM

## (undated) DEVICE — MARKER ENDOSCOPIC SPOT EX

## (undated) DEVICE — FORCEP ENDO BIOPSY CAPTURA

## (undated) DEVICE — NDL INTERJECT CATH 25G 200CM